# Patient Record
Sex: FEMALE | Race: WHITE | Employment: OTHER | URBAN - METROPOLITAN AREA
[De-identification: names, ages, dates, MRNs, and addresses within clinical notes are randomized per-mention and may not be internally consistent; named-entity substitution may affect disease eponyms.]

---

## 2021-03-04 ENCOUNTER — HOSPITAL ENCOUNTER (OUTPATIENT)
Dept: MRI IMAGING | Age: 61
Discharge: HOME | End: 2021-03-04
Attending: PSYCHIATRY & NEUROLOGY
Payer: COMMERCIAL

## 2021-03-04 DIAGNOSIS — I67.81 LEUKOARIOSIS: ICD-10-CM

## 2021-03-04 PROCEDURE — 72141 MRI NECK SPINE W/O DYE: CPT

## 2023-11-22 ENCOUNTER — OFFICE VISIT (OUTPATIENT)
Dept: INTERNAL MEDICINE CLINIC | Facility: CLINIC | Age: 63
End: 2023-11-22
Payer: MEDICARE

## 2023-11-22 VITALS
SYSTOLIC BLOOD PRESSURE: 138 MMHG | DIASTOLIC BLOOD PRESSURE: 82 MMHG | OXYGEN SATURATION: 96 % | HEART RATE: 63 BPM | WEIGHT: 189.2 LBS

## 2023-11-22 DIAGNOSIS — E03.9 HYPOTHYROIDISM, UNSPECIFIED TYPE: ICD-10-CM

## 2023-11-22 DIAGNOSIS — E78.5 HYPERLIPIDEMIA, UNSPECIFIED HYPERLIPIDEMIA TYPE: ICD-10-CM

## 2023-11-22 DIAGNOSIS — I10 HYPERTENSION, UNSPECIFIED TYPE: ICD-10-CM

## 2023-11-22 DIAGNOSIS — R73.9 HIGH BLOOD SUGAR: ICD-10-CM

## 2023-11-22 DIAGNOSIS — M79.7 FIBROMYALGIA SYNDROME: ICD-10-CM

## 2023-11-22 DIAGNOSIS — I35.1 AORTIC VALVE INSUFFICIENCY, ETIOLOGY OF CARDIAC VALVE DISEASE UNSPECIFIED: Primary | ICD-10-CM

## 2023-11-22 DIAGNOSIS — R82.90 ABNORMAL URINE: ICD-10-CM

## 2023-11-22 DIAGNOSIS — F33.41 RECURRENT MAJOR DEPRESSIVE DISORDER, IN PARTIAL REMISSION (HCC): ICD-10-CM

## 2023-11-22 DIAGNOSIS — G47.33 OBSTRUCTIVE SLEEP APNEA: ICD-10-CM

## 2023-11-22 LAB
ALBUMIN SERPL-MCNC: 4.2 G/DL (ref 3.2–4.6)
ALBUMIN/GLOB SERPL: 1.4 (ref 0.4–1.6)
ALP SERPL-CCNC: 90 U/L (ref 50–136)
ALT SERPL-CCNC: 23 U/L (ref 12–65)
ANION GAP SERPL CALC-SCNC: 7 MMOL/L (ref 2–11)
APPEARANCE UR: CLEAR
AST SERPL-CCNC: 14 U/L (ref 15–37)
BACTERIA URNS QL MICRO: ABNORMAL /HPF
BASOPHILS # BLD: 0.1 K/UL (ref 0–0.2)
BASOPHILS NFR BLD: 1 % (ref 0–2)
BILIRUB SERPL-MCNC: 0.6 MG/DL (ref 0.2–1.1)
BILIRUB UR QL: NEGATIVE
BUN SERPL-MCNC: 12 MG/DL (ref 8–23)
CALCIUM SERPL-MCNC: 9.2 MG/DL (ref 8.3–10.4)
CASTS URNS QL MICRO: 0 /LPF
CHLORIDE SERPL-SCNC: 108 MMOL/L (ref 101–110)
CHOLEST SERPL-MCNC: 189 MG/DL
CO2 SERPL-SCNC: 26 MMOL/L (ref 21–32)
COLOR UR: ABNORMAL
CREAT SERPL-MCNC: 0.9 MG/DL (ref 0.6–1)
CRYSTALS URNS QL MICRO: 0 /LPF
DIFFERENTIAL METHOD BLD: NORMAL
EOSINOPHIL # BLD: 0.3 K/UL (ref 0–0.8)
EOSINOPHIL NFR BLD: 4 % (ref 0.5–7.8)
EPI CELLS #/AREA URNS HPF: ABNORMAL /HPF
ERYTHROCYTE [DISTWIDTH] IN BLOOD BY AUTOMATED COUNT: 13.9 % (ref 11.9–14.6)
GLOBULIN SER CALC-MCNC: 2.9 G/DL (ref 2.8–4.5)
GLUCOSE SERPL-MCNC: 92 MG/DL (ref 65–100)
GLUCOSE UR STRIP.AUTO-MCNC: NEGATIVE MG/DL
HCT VFR BLD AUTO: 43.3 % (ref 35.8–46.3)
HDLC SERPL-MCNC: 41 MG/DL (ref 40–60)
HDLC SERPL: 4.6
HGB BLD-MCNC: 14.2 G/DL (ref 11.7–15.4)
HGB UR QL STRIP: NEGATIVE
IMM GRANULOCYTES # BLD AUTO: 0 K/UL (ref 0–0.5)
IMM GRANULOCYTES NFR BLD AUTO: 0 % (ref 0–5)
KETONES UR QL STRIP.AUTO: NEGATIVE MG/DL
LDLC SERPL CALC-MCNC: 123.4 MG/DL
LEUKOCYTE ESTERASE UR QL STRIP.AUTO: ABNORMAL
LYMPHOCYTES # BLD: 1.9 K/UL (ref 0.5–4.6)
LYMPHOCYTES NFR BLD: 30 % (ref 13–44)
MCH RBC QN AUTO: 28.2 PG (ref 26.1–32.9)
MCHC RBC AUTO-ENTMCNC: 32.8 G/DL (ref 31.4–35)
MCV RBC AUTO: 85.9 FL (ref 82–102)
MONOCYTES # BLD: 0.3 K/UL (ref 0.1–1.3)
MONOCYTES NFR BLD: 5 % (ref 4–12)
MUCOUS THREADS URNS QL MICRO: 0 /LPF
NEUTS SEG # BLD: 3.8 K/UL (ref 1.7–8.2)
NEUTS SEG NFR BLD: 60 % (ref 43–78)
NITRITE UR QL STRIP.AUTO: NEGATIVE
NRBC # BLD: 0 K/UL (ref 0–0.2)
OTHER OBSERVATIONS: ABNORMAL
PH UR STRIP: 7 (ref 5–9)
PLATELET # BLD AUTO: 281 K/UL (ref 150–450)
PMV BLD AUTO: 11.5 FL (ref 9.4–12.3)
POTASSIUM SERPL-SCNC: 4.2 MMOL/L (ref 3.5–5.1)
PROT SERPL-MCNC: 7.1 G/DL (ref 6.3–8.2)
PROT UR STRIP-MCNC: NEGATIVE MG/DL
RBC # BLD AUTO: 5.04 M/UL (ref 4.05–5.2)
RBC #/AREA URNS HPF: ABNORMAL /HPF
SODIUM SERPL-SCNC: 141 MMOL/L (ref 133–143)
SP GR UR REFRACTOMETRY: 1.02 (ref 1–1.02)
TRIGL SERPL-MCNC: 123 MG/DL (ref 35–150)
TSH, 3RD GENERATION: 3.15 UIU/ML (ref 0.36–3.74)
URINE CULTURE IF INDICATED: ABNORMAL
UROBILINOGEN UR QL STRIP.AUTO: 1 EU/DL (ref 0.2–1)
VLDLC SERPL CALC-MCNC: 24.6 MG/DL (ref 6–23)
WBC # BLD AUTO: 6.4 K/UL (ref 4.3–11.1)
WBC URNS QL MICRO: ABNORMAL /HPF

## 2023-11-22 PROCEDURE — 3079F DIAST BP 80-89 MM HG: CPT | Performed by: INTERNAL MEDICINE

## 2023-11-22 PROCEDURE — G8421 BMI NOT CALCULATED: HCPCS | Performed by: INTERNAL MEDICINE

## 2023-11-22 PROCEDURE — 99204 OFFICE O/P NEW MOD 45 MIN: CPT | Performed by: INTERNAL MEDICINE

## 2023-11-22 PROCEDURE — 3017F COLORECTAL CA SCREEN DOC REV: CPT | Performed by: INTERNAL MEDICINE

## 2023-11-22 PROCEDURE — 1036F TOBACCO NON-USER: CPT | Performed by: INTERNAL MEDICINE

## 2023-11-22 PROCEDURE — G8427 DOCREV CUR MEDS BY ELIG CLIN: HCPCS | Performed by: INTERNAL MEDICINE

## 2023-11-22 PROCEDURE — G8484 FLU IMMUNIZE NO ADMIN: HCPCS | Performed by: INTERNAL MEDICINE

## 2023-11-22 PROCEDURE — 3075F SYST BP GE 130 - 139MM HG: CPT | Performed by: INTERNAL MEDICINE

## 2023-11-22 RX ORDER — ASPIRIN 81 MG/1
81 TABLET, CHEWABLE ORAL DAILY
COMMUNITY

## 2023-11-22 RX ORDER — NAPROXEN 500 MG/1
500 TABLET ORAL 2 TIMES DAILY WITH MEALS
COMMUNITY

## 2023-11-22 NOTE — PROGRESS NOTES
FOLLOW UP VISIT    Subjective:    Annamaria López (: 1960) is a 59 y.o., female,   Chief Complaint   Patient presents with    [facility] Care       HPI:  Very nice 59year old female in to [facility] care  1. NSTEMI has seen Cardiology Dr. Mao Spotted   2 CAD on asa   3. High blood pressure on meds   4. High cholesterol- on meds  5. Hypothyroid on meds  6. Fibromyalgia on cymblata  7. Depression on cymbalta  8.  HCM Colonoscopy : 2016 - due  Mammogram 2023 Pap S/P TAHBSO CPE  6 months  Vaccines  due          The following portions of the patient's history were reviewed and updated as appropriate:      Past Medical History:   Diagnosis Date    Acne rosacea     Anxiety and depression     Aortic insufficiency 2016    mild to moderate AI     Atopic dermatitis     Atrophic gastritis without hemorrhage     Bronchitis     CAD (coronary artery disease)     Chest pain, mid sternal 2015    Chronic depression 2016    Chronic intractable pain 2016    Chronic left hip pain     Claustrophobia     DDD (degenerative disc disease)     Depression headache     Dizziness     Dysuria     Easy bruising     Fatigue     Female stress incontinence 2015    Fibromyalgia muscle pain     Fibromyalgia syndrome     Headache     Heat stroke and sunstroke     Hormone replacement therapy     Hot flashes     Hypercholesteremia     Hyperlipemia 2015    Hypersomnia due to medical condition 2015    Hypertension     Hypothyroidism     Insomnia     Joint inflammation     Joint pain     Keratosis, actinic     Major depressive disorder, recurrent episode, moderate with melancholic features ([address]) 2/10/2792    Menopausal disorder     Migraine     Nocturnal hypoxemia 2015    NSTEMI (non-ST elevated myocardial infarction) ([address]) 2015    Obesity 2016    Obstructive sleep apnea 2015    Osteoarthritis 2016    knee osteoarthritis    Palpitations     PLMD (periodic limb movement disorder) 2015    Sciatic

## 2023-11-23 LAB
EST. AVERAGE GLUCOSE BLD GHB EST-MCNC: 105 MG/DL
HBA1C MFR BLD: 5.3 % (ref 4.8–5.6)

## 2023-11-25 LAB
BACTERIA SPEC CULT: NORMAL
BACTERIA SPEC CULT: NORMAL
SERVICE CMNT-IMP: NORMAL

## 2024-02-22 ENCOUNTER — OFFICE VISIT (OUTPATIENT)
Dept: PRIMARY CARE CLINIC | Facility: CLINIC | Age: 64
End: 2024-02-22
Payer: MEDICARE

## 2024-02-22 VITALS
HEART RATE: 78 BPM | HEIGHT: 61 IN | BODY MASS INDEX: 35.8 KG/M2 | WEIGHT: 189.6 LBS | SYSTOLIC BLOOD PRESSURE: 118 MMHG | DIASTOLIC BLOOD PRESSURE: 68 MMHG | OXYGEN SATURATION: 98 %

## 2024-02-22 DIAGNOSIS — Z00.00 ANNUAL PHYSICAL EXAM: ICD-10-CM

## 2024-02-22 DIAGNOSIS — Z71.89 ACP (ADVANCE CARE PLANNING): ICD-10-CM

## 2024-02-22 DIAGNOSIS — Z00.00 ENCOUNTER FOR WELL ADULT EXAM WITHOUT ABNORMAL FINDINGS: Primary | ICD-10-CM

## 2024-02-22 DIAGNOSIS — R73.9 HIGH BLOOD SUGAR: ICD-10-CM

## 2024-02-22 LAB
ALBUMIN SERPL-MCNC: 3.7 G/DL (ref 3.2–4.6)
ALBUMIN/GLOB SERPL: 1.2 (ref 0.4–1.6)
ALP SERPL-CCNC: 91 U/L (ref 50–136)
ALT SERPL-CCNC: 26 U/L (ref 12–65)
ANION GAP SERPL CALC-SCNC: 3 MMOL/L (ref 2–11)
APPEARANCE UR: CLEAR
AST SERPL-CCNC: 15 U/L (ref 15–37)
BACTERIA URNS QL MICRO: ABNORMAL /HPF
BASOPHILS # BLD: 0.1 K/UL (ref 0–0.2)
BASOPHILS NFR BLD: 1 % (ref 0–2)
BILIRUB SERPL-MCNC: 0.5 MG/DL (ref 0.2–1.1)
BILIRUB UR QL: NEGATIVE
BUN SERPL-MCNC: 10 MG/DL (ref 8–23)
CALCIUM SERPL-MCNC: 9.2 MG/DL (ref 8.3–10.4)
CASTS URNS QL MICRO: 0 /LPF
CHLORIDE SERPL-SCNC: 109 MMOL/L (ref 103–113)
CHOLEST SERPL-MCNC: 180 MG/DL
CO2 SERPL-SCNC: 29 MMOL/L (ref 21–32)
COLOR UR: ABNORMAL
CREAT SERPL-MCNC: 0.8 MG/DL (ref 0.6–1)
CRYSTALS URNS QL MICRO: 0 /LPF
DIFFERENTIAL METHOD BLD: ABNORMAL
EOSINOPHIL # BLD: 0.6 K/UL (ref 0–0.8)
EOSINOPHIL NFR BLD: 9 % (ref 0.5–7.8)
EPI CELLS #/AREA URNS HPF: ABNORMAL /HPF
ERYTHROCYTE [DISTWIDTH] IN BLOOD BY AUTOMATED COUNT: 13.7 % (ref 11.9–14.6)
GLOBULIN SER CALC-MCNC: 3.1 G/DL (ref 2.8–4.5)
GLUCOSE SERPL-MCNC: 88 MG/DL (ref 65–100)
GLUCOSE UR STRIP.AUTO-MCNC: NEGATIVE MG/DL
HCT VFR BLD AUTO: 40.6 % (ref 35.8–46.3)
HDLC SERPL-MCNC: 42 MG/DL (ref 40–60)
HDLC SERPL: 4.3
HGB BLD-MCNC: 13.3 G/DL (ref 11.7–15.4)
HGB UR QL STRIP: NEGATIVE
IMM GRANULOCYTES # BLD AUTO: 0 K/UL (ref 0–0.5)
IMM GRANULOCYTES NFR BLD AUTO: 1 % (ref 0–5)
KETONES UR QL STRIP.AUTO: NEGATIVE MG/DL
LDLC SERPL CALC-MCNC: 113 MG/DL
LEUKOCYTE ESTERASE UR QL STRIP.AUTO: ABNORMAL
LYMPHOCYTES # BLD: 1.7 K/UL (ref 0.5–4.6)
LYMPHOCYTES NFR BLD: 24 % (ref 13–44)
MCH RBC QN AUTO: 28.4 PG (ref 26.1–32.9)
MCHC RBC AUTO-ENTMCNC: 32.8 G/DL (ref 31.4–35)
MCV RBC AUTO: 86.8 FL (ref 82–102)
MONOCYTES # BLD: 0.4 K/UL (ref 0.1–1.3)
MONOCYTES NFR BLD: 5 % (ref 4–12)
MUCOUS THREADS URNS QL MICRO: ABNORMAL /LPF
NEUTS SEG # BLD: 4.3 K/UL (ref 1.7–8.2)
NEUTS SEG NFR BLD: 60 % (ref 43–78)
NITRITE UR QL STRIP.AUTO: NEGATIVE
NRBC # BLD: 0 K/UL (ref 0–0.2)
OTHER OBSERVATIONS: ABNORMAL
PH UR STRIP: 5.5 (ref 5–9)
PLATELET # BLD AUTO: 247 K/UL (ref 150–450)
PMV BLD AUTO: 11.5 FL (ref 9.4–12.3)
POTASSIUM SERPL-SCNC: 4.4 MMOL/L (ref 3.5–5.1)
PROT SERPL-MCNC: 6.8 G/DL (ref 6.3–8.2)
PROT UR STRIP-MCNC: NEGATIVE MG/DL
RBC # BLD AUTO: 4.68 M/UL (ref 4.05–5.2)
RBC #/AREA URNS HPF: ABNORMAL /HPF
SODIUM SERPL-SCNC: 141 MMOL/L (ref 136–146)
SP GR UR REFRACTOMETRY: 1.01 (ref 1–1.02)
TRIGL SERPL-MCNC: 125 MG/DL (ref 35–150)
TSH, 3RD GENERATION: 4.57 UIU/ML (ref 0.36–3.74)
URINE CULTURE IF INDICATED: ABNORMAL
UROBILINOGEN UR QL STRIP.AUTO: 0.2 EU/DL (ref 0.2–1)
VLDLC SERPL CALC-MCNC: 25 MG/DL (ref 6–23)
WBC # BLD AUTO: 7.1 K/UL (ref 4.3–11.1)
WBC URNS QL MICRO: ABNORMAL /HPF

## 2024-02-22 PROCEDURE — 3074F SYST BP LT 130 MM HG: CPT | Performed by: INTERNAL MEDICINE

## 2024-02-22 PROCEDURE — 3078F DIAST BP <80 MM HG: CPT | Performed by: INTERNAL MEDICINE

## 2024-02-22 PROCEDURE — 99396 PREV VISIT EST AGE 40-64: CPT | Performed by: INTERNAL MEDICINE

## 2024-02-22 RX ORDER — LISINOPRIL 10 MG/1
10 TABLET ORAL DAILY
Qty: 90 TABLET | Refills: 1 | Status: SHIPPED | OUTPATIENT
Start: 2024-02-22

## 2024-02-22 RX ORDER — LEVOTHYROXINE SODIUM 0.05 MG/1
50 TABLET ORAL
Qty: 90 TABLET | Refills: 1 | Status: SHIPPED | OUTPATIENT
Start: 2024-02-22

## 2024-02-22 RX ORDER — RIZATRIPTAN BENZOATE 10 MG/1
10 TABLET ORAL
Qty: 30 TABLET | Refills: 1 | Status: SHIPPED | OUTPATIENT
Start: 2024-02-22 | End: 2024-02-22

## 2024-02-22 RX ORDER — PRAVASTATIN SODIUM 40 MG
40 TABLET ORAL DAILY
Qty: 90 TABLET | Refills: 1 | Status: SHIPPED | OUTPATIENT
Start: 2024-02-22

## 2024-02-22 RX ORDER — DULOXETIN HYDROCHLORIDE 60 MG/1
60 CAPSULE, DELAYED RELEASE ORAL DAILY
Qty: 90 CAPSULE | Refills: 0 | Status: SHIPPED | OUTPATIENT
Start: 2024-02-22

## 2024-02-22 RX ORDER — METOPROLOL TARTRATE 50 MG/1
50 TABLET, FILM COATED ORAL 2 TIMES DAILY
Qty: 180 TABLET | Refills: 1 | Status: SHIPPED | OUTPATIENT
Start: 2024-02-22

## 2024-02-22 ASSESSMENT — PATIENT HEALTH QUESTIONNAIRE - PHQ9
3. TROUBLE FALLING OR STAYING ASLEEP: 0
5. POOR APPETITE OR OVEREATING: 0
7. TROUBLE CONCENTRATING ON THINGS, SUCH AS READING THE NEWSPAPER OR WATCHING TELEVISION: 3
SUM OF ALL RESPONSES TO PHQ QUESTIONS 1-9: 10
10. IF YOU CHECKED OFF ANY PROBLEMS, HOW DIFFICULT HAVE THESE PROBLEMS MADE IT FOR YOU TO DO YOUR WORK, TAKE CARE OF THINGS AT HOME, OR GET ALONG WITH OTHER PEOPLE: 0
6. FEELING BAD ABOUT YOURSELF - OR THAT YOU ARE A FAILURE OR HAVE LET YOURSELF OR YOUR FAMILY DOWN: 1
SUM OF ALL RESPONSES TO PHQ QUESTIONS 1-9: 10
4. FEELING TIRED OR HAVING LITTLE ENERGY: 3
9. THOUGHTS THAT YOU WOULD BE BETTER OFF DEAD, OR OF HURTING YOURSELF: 0
SUM OF ALL RESPONSES TO PHQ QUESTIONS 1-9: 10
SUM OF ALL RESPONSES TO PHQ QUESTIONS 1-9: 10
2. FEELING DOWN, DEPRESSED OR HOPELESS: 3
8. MOVING OR SPEAKING SO SLOWLY THAT OTHER PEOPLE COULD HAVE NOTICED. OR THE OPPOSITE, BEING SO FIGETY OR RESTLESS THAT YOU HAVE BEEN MOVING AROUND A LOT MORE THAN USUAL: 0

## 2024-02-22 ASSESSMENT — ENCOUNTER SYMPTOMS
CONSTIPATION: 0
BLOOD IN STOOL: 0
EYE PAIN: 0
EYES NEGATIVE: 1
EYE ITCHING: 0
CHEST TIGHTNESS: 0
RECTAL PAIN: 0
ABDOMINAL DISTENTION: 0
BACK PAIN: 0
WHEEZING: 0
DIARRHEA: 0
ALLERGIC/IMMUNOLOGIC NEGATIVE: 1
NAUSEA: 0
STRIDOR: 0
GASTROINTESTINAL NEGATIVE: 1
ANAL BLEEDING: 0
CHOKING: 0
ABDOMINAL PAIN: 0
SHORTNESS OF BREATH: 0
COUGH: 0
EYE DISCHARGE: 0
EYE REDNESS: 0
RESPIRATORY NEGATIVE: 1
SINUS PRESSURE: 0
RHINORRHEA: 0
COLOR CHANGE: 0

## 2024-02-23 LAB
EST. AVERAGE GLUCOSE BLD GHB EST-MCNC: 103 MG/DL
HBA1C MFR BLD: 5.2 % (ref 4.8–5.6)

## 2024-02-24 ENCOUNTER — PATIENT MESSAGE (OUTPATIENT)
Dept: PRIMARY CARE CLINIC | Facility: CLINIC | Age: 64
End: 2024-02-24

## 2024-02-26 ENCOUNTER — TELEPHONE (OUTPATIENT)
Dept: PRIMARY CARE CLINIC | Facility: CLINIC | Age: 64
End: 2024-02-26

## 2024-02-26 NOTE — TELEPHONE ENCOUNTER
From: Tyrone Pink  To: Dr. Brooke Shafer  Sent: 2/24/2024 5:51 AM EST  Subject: Uti    Back is killing me need util medicine please Tyrone Pink

## 2024-02-26 NOTE — TELEPHONE ENCOUNTER
Pt reports the following:  Left side lower back pain with onset Saturday 2/24/24 - no known trauma/injury  Pt also reports new onset of vaginal itching.

## 2024-02-26 NOTE — TELEPHONE ENCOUNTER
LVM to call office if she would like to see if PCP can see her sooner than 02/28/24. Pt also informed PCP does not prescribe controlled substances and/or narcotics.

## 2024-02-27 ENCOUNTER — TELEMEDICINE (OUTPATIENT)
Dept: PRIMARY CARE CLINIC | Facility: CLINIC | Age: 64
End: 2024-02-27
Payer: MEDICARE

## 2024-02-27 DIAGNOSIS — E78.5 HYPERLIPIDEMIA, UNSPECIFIED HYPERLIPIDEMIA TYPE: ICD-10-CM

## 2024-02-27 DIAGNOSIS — R82.90 ABNORMAL URINE: ICD-10-CM

## 2024-02-27 DIAGNOSIS — E03.9 HYPOTHYROIDISM, UNSPECIFIED TYPE: ICD-10-CM

## 2024-02-27 DIAGNOSIS — R73.9 HIGH BLOOD SUGAR: ICD-10-CM

## 2024-02-27 DIAGNOSIS — I10 HYPERTENSION, UNSPECIFIED TYPE: Primary | ICD-10-CM

## 2024-02-27 PROCEDURE — 99214 OFFICE O/P EST MOD 30 MIN: CPT | Performed by: INTERNAL MEDICINE

## 2024-02-27 RX ORDER — NITROFURANTOIN 25; 75 MG/1; MG/1
100 CAPSULE ORAL 2 TIMES DAILY
Qty: 10 CAPSULE | Refills: 0 | Status: SHIPPED | OUTPATIENT
Start: 2024-02-27 | End: 2024-03-03

## 2024-02-27 RX ORDER — LEVOTHYROXINE SODIUM 0.07 MG/1
75 TABLET ORAL DAILY
Qty: 90 TABLET | Refills: 1 | Status: SHIPPED | OUTPATIENT
Start: 2024-02-27

## 2024-02-27 ASSESSMENT — ENCOUNTER SYMPTOMS
GASTROINTESTINAL NEGATIVE: 1
RESPIRATORY NEGATIVE: 1

## 2024-02-27 NOTE — PROGRESS NOTES
FOLLOW UP VISIT    Subjective:    Tyrone Pink (: 1960) is a 63 y.o., female,   Chief Complaint   Patient presents with   • Discuss Labs       HPI:  Very nice 63 year old female in to follow up.  TSH high and urine was abnl  1. NSTEMI has seen Cardiology Dr. Nichoals   2 CAD on asa   3. High blood pressure on meds   4. High cholesterol- on meds  5. Hypothyroid on meds TSH slight high does have fatigue  6. Fibromyalgia on cymblata  7. Depression on cymbalta  8. HCM Colonoscopy : 2016 - due  Mammogram 2023 Pap S/P TAHBSO CPE  6 months  Vaccines  due        The following portions of the patient's history were reviewed and updated as appropriate:      Past Medical History:   Diagnosis Date   • Acne rosacea    • Anxiety and depression    • Aortic insufficiency 2016    mild to moderate AI    • Atopic dermatitis    • Atrophic gastritis without hemorrhage    • Bronchitis    • CAD (coronary artery disease)    • Chest pain, mid sternal 2015   • Chronic depression 2016   • Chronic intractable pain 2016   • Chronic left hip pain    • Claustrophobia    • DDD (degenerative disc disease)    • Depression headache    • Dizziness    • Dysuria    • Easy bruising    • Fatigue    • Female stress incontinence 2015   • Fibromyalgia muscle pain    • Fibromyalgia syndrome    • Headache    • Heat stroke and sunstroke    • Hormone replacement therapy    • Hot flashes    • Hypercholesteremia    • Hyperlipemia 2015   • Hypersomnia due to medical condition 2015   • Hypertension    • Hypothyroidism    • Insomnia    • Joint inflammation    • Joint pain    • Keratosis, actinic    • Major depressive disorder, recurrent episode, moderate with melancholic features ([facility]) 2016   • Menopausal disorder    • Migraine    • Nocturnal hypoxemia 2015   • NSTEMI (non-ST elevated myocardial infarction) ([facility]) 2015   • Obesity 2016   • Obstructive sleep apnea 2015   • Osteoarthritis 2016

## 2024-04-02 ENCOUNTER — OFFICE VISIT (OUTPATIENT)
Dept: PRIMARY CARE CLINIC | Facility: CLINIC | Age: 64
End: 2024-04-02
Payer: MEDICARE

## 2024-04-02 DIAGNOSIS — M25.519 SHOULDER PAIN, UNSPECIFIED CHRONICITY, UNSPECIFIED LATERALITY: ICD-10-CM

## 2024-04-02 DIAGNOSIS — M54.2 NECK PAIN: Primary | ICD-10-CM

## 2024-04-02 PROCEDURE — 99214 OFFICE O/P EST MOD 30 MIN: CPT | Performed by: INTERNAL MEDICINE

## 2024-04-02 RX ORDER — LORAZEPAM 0.5 MG/1
0.5 TABLET ORAL DAILY PRN
Qty: 2 TABLET | Refills: 0 | Status: SHIPPED | OUTPATIENT
Start: 2024-04-02 | End: 2024-04-04

## 2024-04-02 RX ORDER — METHYLPREDNISOLONE 4 MG/1
TABLET ORAL
Qty: 1 KIT | Refills: 0 | Status: SHIPPED | OUTPATIENT
Start: 2024-04-02 | End: 2024-04-08

## 2024-04-02 RX ORDER — CYCLOBENZAPRINE HCL 10 MG
10 TABLET ORAL NIGHTLY PRN
Qty: 10 TABLET | Refills: 0 | Status: SHIPPED | OUTPATIENT
Start: 2024-04-02 | End: 2024-04-12

## 2024-04-02 SDOH — ECONOMIC STABILITY: FOOD INSECURITY: WITHIN THE PAST 12 MONTHS, YOU WORRIED THAT YOUR FOOD WOULD RUN OUT BEFORE YOU GOT MONEY TO BUY MORE.: NEVER TRUE

## 2024-04-02 SDOH — ECONOMIC STABILITY: INCOME INSECURITY: HOW HARD IS IT FOR YOU TO PAY FOR THE VERY BASICS LIKE FOOD, HOUSING, MEDICAL CARE, AND HEATING?: NOT HARD AT ALL

## 2024-04-02 SDOH — ECONOMIC STABILITY: FOOD INSECURITY: WITHIN THE PAST 12 MONTHS, THE FOOD YOU BOUGHT JUST DIDN'T LAST AND YOU DIDN'T HAVE MONEY TO GET MORE.: NEVER TRUE

## 2024-04-02 SDOH — ECONOMIC STABILITY: HOUSING INSECURITY
IN THE LAST 12 MONTHS, WAS THERE A TIME WHEN YOU DID NOT HAVE A STEADY PLACE TO SLEEP OR SLEPT IN A SHELTER (INCLUDING NOW)?: NO

## 2024-04-02 ASSESSMENT — ENCOUNTER SYMPTOMS: RESPIRATORY NEGATIVE: 1

## 2024-04-02 NOTE — PROGRESS NOTES
Hunger Vital Sign    • Worried About Running Out of Food in the Last Year: Never true    • Ran Out of Food in the Last Year: Never true   Transportation Needs: Unknown (4/2/2024)    PRAPARE - Transportation    • Lack of Transportation (Medical): Not on file    • Lack of Transportation (Non-Medical): No   Physical Activity: Not on file   Stress: Not on file   Social Connections: Not on file   Intimate Partner Violence: Not on file   Housing Stability: Unknown (4/2/2024)    Housing Stability Vital Sign    • Unable to Pay for Housing in the Last Year: Not on file    • Number of Places Lived in the Last Year: Not on file    • Unstable Housing in the Last Year: No       Current Outpatient Medications   Medication Sig Dispense Refill   • levothyroxine (SYNTHROID) 75 MCG tablet Take 1 tablet by mouth daily 90 tablet 1   • lisinopril (PRINIVIL;ZESTRIL) 10 MG tablet Take 1 tablet by mouth daily 90 tablet 1   • pravastatin (PRAVACHOL) 40 MG tablet Take 1 tablet by mouth daily 90 tablet 1   • metoprolol tartrate (LOPRESSOR) 50 MG tablet Take 1 tablet by mouth 2 times daily 180 tablet 1   • DULoxetine (CYMBALTA) 60 MG extended release capsule Take 1 capsule by mouth daily 90 capsule 0   • aspirin 81 MG chewable tablet Take 1 tablet by mouth daily     • naproxen (NAPROSYN) 500 MG tablet Take 1 tablet by mouth 2 times daily as needed     • rizatriptan (MAXALT) 10 MG tablet Take 1 tablet by mouth once as needed for Migraine 30 tablet 1     No current facility-administered medications for this visit.       Allergies as of 04/02/2024   • (No Known Allergies)       Review of Systems   Constitutional: Negative.    Respiratory: Negative.     Cardiovascular: Negative.    Genitourinary: Negative.      Objective:    There were no vitals taken for this visit.    Physical Exam  Vitals and nursing note reviewed.   Constitutional:       Appearance: Normal appearance.   Cardiovascular:      Rate and Rhythm: Normal rate and regular rhythm.

## 2024-04-12 ENCOUNTER — TELEPHONE (OUTPATIENT)
Dept: PRIMARY CARE CLINIC | Facility: CLINIC | Age: 64
End: 2024-04-12

## 2024-04-12 NOTE — TELEPHONE ENCOUNTER
Jennifer MONTOYA pre access  reports [facility] requesting- peer 2 peer r/t MRI 04/10/24 being declined  Phone #: [phone]  Case ID: [id number]

## 2024-04-22 ENCOUNTER — OFFICE VISIT (OUTPATIENT)
Dept: ORTHOPEDIC SURGERY | Age: 64
End: 2024-04-22
Payer: MEDICARE

## 2024-04-22 DIAGNOSIS — G56.03 BILATERAL CARPAL TUNNEL SYNDROME: ICD-10-CM

## 2024-04-22 DIAGNOSIS — M50.30 DEGENERATIVE DISC DISEASE, CERVICAL: Primary | ICD-10-CM

## 2024-04-22 DIAGNOSIS — M25.511 ACUTE PAIN OF BOTH SHOULDERS: ICD-10-CM

## 2024-04-22 DIAGNOSIS — M25.512 ACUTE PAIN OF BOTH SHOULDERS: ICD-10-CM

## 2024-04-22 PROCEDURE — 99204 OFFICE O/P NEW MOD 45 MIN: CPT | Performed by: NURSE PRACTITIONER

## 2024-04-22 RX ORDER — MELOXICAM 15 MG/1
15 TABLET ORAL DAILY PRN
Qty: 30 TABLET | Refills: 0 | Status: SHIPPED | OUTPATIENT
Start: 2024-04-22

## 2024-04-22 RX ORDER — CYCLOBENZAPRINE HCL 5 MG
5 TABLET ORAL 3 TIMES DAILY PRN
Qty: 15 TABLET | Refills: 0 | Status: SHIPPED | OUTPATIENT
Start: 2024-04-22 | End: 2024-05-02

## 2024-04-22 NOTE — PROGRESS NOTES
(non-ST elevated myocardial infarction) (HCC) 5/28/2015    Obesity 7/11/2016    Obstructive sleep apnea 5/8/2015    Osteoarthritis 7/11/2016    knee osteoarthritis    Palpitations     PLMD (periodic limb movement disorder) 5/8/2015    Sciatic nerve pain 7/11/2016    Sinusitis     Sleep apnea     cpap at night    Tachycardia 6/1/2016    nsr NO ARRHYTHMIAS    Ulcer of finger (HCC)        Tobacco:  reports that she has never smoked. She has never used smokeless tobacco.  Alcohol:   Social History     Substance and Sexual Activity   Alcohol Use No        Physical Exam:     GENERAL:  Adult in no acute distress, well developed, well nourished . Patient is appropriately conversant  CERVICAL SPINE:  Inspection of the neck reveals no evidence of rash or skin lesion.  Examination of the cervical spine reveals no evidence of sagittal or coronal plane deformity, decreased ROM, and palpable tenderness  Spurling's sign painful but negative side for reproduction of radicular symptoms.    Gait does not suggest myelopathy.    Sensory testing reveals intact sensation to light touch in the distribution of the C5-T1 dermatomes bilaterally.    Reflexes     Right Left   Biceps (C5) 2 2   Brachio radialis (C6) 2 2    Triceps (C7) 2 2     Rae's is negative    Ankle jerk is negative   Finger escape test is negative  Inverted radial reflex is negative    Tinel's and Angelika testing over the cubital and carpal tunnels does reproduce the symptoms.    Shoulder examination is  consistent with adhesive capsulitis or acute rotator cuff tendinitis.  Patient has significant range of motion limitation of both shoulders right is greater than left.    The patient does not have difficulty with rapid alternating hand movements.    Strength testing in the upper extremity reveals the following based on the 5 point grading scale:     Delt(C5) Bicep(C6) WE(C6) Tricep (C7) WF(C7) (C8) Int (T1)   Right 5 5 5 5 5 5 5   Left 5 5 5 5 5 5 5     Pulses are

## 2024-05-22 ENCOUNTER — OFFICE VISIT (OUTPATIENT)
Dept: ORTHOPEDIC SURGERY | Age: 64
End: 2024-05-22
Payer: MEDICARE

## 2024-05-22 DIAGNOSIS — M25.512 BILATERAL SHOULDER PAIN, UNSPECIFIED CHRONICITY: Primary | ICD-10-CM

## 2024-05-22 DIAGNOSIS — M25.511 BILATERAL SHOULDER PAIN, UNSPECIFIED CHRONICITY: Primary | ICD-10-CM

## 2024-05-22 DIAGNOSIS — M67.922 TENDINOPATHY OF LEFT BICEPS: ICD-10-CM

## 2024-05-22 PROCEDURE — 20610 DRAIN/INJ JOINT/BURSA W/O US: CPT | Performed by: ORTHOPAEDIC SURGERY

## 2024-05-22 PROCEDURE — 99204 OFFICE O/P NEW MOD 45 MIN: CPT | Performed by: ORTHOPAEDIC SURGERY

## 2024-05-22 RX ORDER — METHYLPREDNISOLONE ACETATE 40 MG/ML
160 INJECTION, SUSPENSION INTRA-ARTICULAR; INTRALESIONAL; INTRAMUSCULAR; SOFT TISSUE ONCE
Status: COMPLETED | OUTPATIENT
Start: 2024-05-22 | End: 2024-05-22

## 2024-05-22 RX ADMIN — METHYLPREDNISOLONE ACETATE 160 MG: 40 INJECTION, SUSPENSION INTRA-ARTICULAR; INTRALESIONAL; INTRAMUSCULAR; SOFT TISSUE at 11:06

## 2024-05-22 NOTE — PROGRESS NOTES
her exam does not significant enough for me to warrant an MRI.  Would suggest trial of injection and therapy.  Left side biceps seems more involved.  Right side seems to be more diffuse.  Discussed appropriate dosing for ibuprofen which I think she has been taking a little too much of.    Treatment:     Recommend therapy to evaluate and treat current complaints and pathology. A home exercise program was also discussed with the patient.  Procedure note: After discussion of risks and benefits including, but not limited to pain, infection, steroid flare, increased blood sugar, fat necrosis, skin discoloration, and injury to blood vessels or nerves, the patient verbally consented to proceed with a glenohumeral joint injection.  The affected left shoulder was sterilely prepped in standard fashion and injected with 2 cc of depo medrol   (40mg/ml), 2 cc of 1% Lidocaine, and 2 cc of 0.5% Marcaine into the anterior shoulder near the BICEPS tendon.  The patient tolerated the injection well.   Procedure note: After discussion of risks and benefits including, but not limited to pain, infection, steroid flare, increased blood sugar, fat necrosis, skin discoloration, and injury to blood vessels or nerves, the patient verbally consented to proceed with a glenohumeral joint injection.  The affected right shoulder was sterilely prepped in standard fashion and injected with 2 cc of depo medrol  (40mg/ml), 2 cc of 1% Lidocaine, and 2 cc of 0.5% Marcaine into the JOINT SPACE.  The patient tolerated the injection well.   The appropriate injections were identified with the patient and her patient identifiers.  Patient is of increased medical complexity and increased risk for surgical intervention secondary to History of NSTEMI, CAD, aortic insufficiency, hypothyroidism, sleep apnea, history of cervical spine pathology/surgery    Return in about 6 weeks (around 7/3/2024).    Thank you for the opportunity to see your patient.  If you have

## 2024-06-10 RX ORDER — MELOXICAM 15 MG/1
15 TABLET ORAL DAILY PRN
Qty: 30 TABLET | Refills: 0 | OUTPATIENT
Start: 2024-06-10

## 2024-06-17 RX ORDER — MELOXICAM 15 MG/1
15 TABLET ORAL DAILY PRN
Qty: 30 TABLET | Refills: 0 | OUTPATIENT
Start: 2024-06-17

## 2024-06-24 ENCOUNTER — OFFICE VISIT (OUTPATIENT)
Dept: PRIMARY CARE CLINIC | Facility: CLINIC | Age: 64
End: 2024-06-24
Payer: MEDICARE

## 2024-06-24 VITALS
OXYGEN SATURATION: 99 % | DIASTOLIC BLOOD PRESSURE: 82 MMHG | HEIGHT: 61 IN | SYSTOLIC BLOOD PRESSURE: 128 MMHG | WEIGHT: 177 LBS | HEART RATE: 62 BPM | BODY MASS INDEX: 33.42 KG/M2

## 2024-06-24 DIAGNOSIS — F32.A DEPRESSION, UNSPECIFIED DEPRESSION TYPE: ICD-10-CM

## 2024-06-24 DIAGNOSIS — E78.5 HYPERLIPIDEMIA, UNSPECIFIED HYPERLIPIDEMIA TYPE: ICD-10-CM

## 2024-06-24 DIAGNOSIS — E03.9 HYPOTHYROIDISM, UNSPECIFIED TYPE: ICD-10-CM

## 2024-06-24 DIAGNOSIS — I10 HYPERTENSION, UNSPECIFIED TYPE: ICD-10-CM

## 2024-06-24 DIAGNOSIS — E03.9 HYPOTHYROIDISM, UNSPECIFIED TYPE: Primary | ICD-10-CM

## 2024-06-24 DIAGNOSIS — R73.9 HIGH BLOOD SUGAR: ICD-10-CM

## 2024-06-24 DIAGNOSIS — G43.819 OTHER MIGRAINE WITHOUT STATUS MIGRAINOSUS, INTRACTABLE: ICD-10-CM

## 2024-06-24 LAB
ALBUMIN SERPL-MCNC: 3.6 G/DL (ref 3.2–4.6)
ALBUMIN/GLOB SERPL: 1.2 (ref 1–1.9)
ALP SERPL-CCNC: 83 U/L (ref 35–104)
ALT SERPL-CCNC: 11 U/L (ref 12–65)
ANION GAP SERPL CALC-SCNC: 10 MMOL/L (ref 9–18)
AST SERPL-CCNC: 18 U/L (ref 15–37)
BASOPHILS # BLD: 0.1 K/UL (ref 0–0.2)
BASOPHILS NFR BLD: 1 % (ref 0–2)
BILIRUB SERPL-MCNC: 0.4 MG/DL (ref 0–1.2)
BUN SERPL-MCNC: 16 MG/DL (ref 8–23)
CALCIUM SERPL-MCNC: 9.4 MG/DL (ref 8.8–10.2)
CHLORIDE SERPL-SCNC: 106 MMOL/L (ref 98–107)
CHOLEST SERPL-MCNC: 178 MG/DL (ref 0–200)
CO2 SERPL-SCNC: 25 MMOL/L (ref 20–28)
CREAT SERPL-MCNC: 0.77 MG/DL (ref 0.6–1.1)
DIFFERENTIAL METHOD BLD: ABNORMAL
EOSINOPHIL # BLD: 0.3 K/UL (ref 0–0.8)
EOSINOPHIL NFR BLD: 4 % (ref 0.5–7.8)
ERYTHROCYTE [DISTWIDTH] IN BLOOD BY AUTOMATED COUNT: 15.6 % (ref 11.9–14.6)
EST. AVERAGE GLUCOSE BLD GHB EST-MCNC: 114 MG/DL
GLOBULIN SER CALC-MCNC: 2.9 G/DL (ref 2.3–3.5)
GLUCOSE SERPL-MCNC: 91 MG/DL (ref 70–99)
HBA1C MFR BLD: 5.6 % (ref 0–5.6)
HCT VFR BLD AUTO: 40.1 % (ref 35.8–46.3)
HDLC SERPL-MCNC: 46 MG/DL (ref 40–60)
HDLC SERPL: 3.9 (ref 0–5)
HGB BLD-MCNC: 12.8 G/DL (ref 11.7–15.4)
IMM GRANULOCYTES # BLD AUTO: 0 K/UL (ref 0–0.5)
IMM GRANULOCYTES NFR BLD AUTO: 1 % (ref 0–5)
LDLC SERPL CALC-MCNC: 114 MG/DL (ref 0–100)
LYMPHOCYTES # BLD: 1.7 K/UL (ref 0.5–4.6)
LYMPHOCYTES NFR BLD: 24 % (ref 13–44)
MCH RBC QN AUTO: 27.2 PG (ref 26.1–32.9)
MCHC RBC AUTO-ENTMCNC: 31.9 G/DL (ref 31.4–35)
MCV RBC AUTO: 85.1 FL (ref 82–102)
MONOCYTES # BLD: 0.4 K/UL (ref 0.1–1.3)
MONOCYTES NFR BLD: 5 % (ref 4–12)
NEUTS SEG # BLD: 4.7 K/UL (ref 1.7–8.2)
NEUTS SEG NFR BLD: 65 % (ref 43–78)
NRBC # BLD: 0 K/UL (ref 0–0.2)
PLATELET # BLD AUTO: 297 K/UL (ref 150–450)
PMV BLD AUTO: 11.3 FL (ref 9.4–12.3)
POTASSIUM SERPL-SCNC: 4.3 MMOL/L (ref 3.5–5.1)
PROT SERPL-MCNC: 6.5 G/DL (ref 6.3–8.2)
RBC # BLD AUTO: 4.71 M/UL (ref 4.05–5.2)
SODIUM SERPL-SCNC: 141 MMOL/L (ref 136–145)
TRIGL SERPL-MCNC: 92 MG/DL (ref 0–150)
TSH, 3RD GENERATION: 2.99 UIU/ML (ref 0.27–4.2)
VLDLC SERPL CALC-MCNC: 18 MG/DL (ref 6–23)
WBC # BLD AUTO: 7.2 K/UL (ref 4.3–11.1)

## 2024-06-24 PROCEDURE — 99214 OFFICE O/P EST MOD 30 MIN: CPT | Performed by: INTERNAL MEDICINE

## 2024-06-24 PROCEDURE — 3079F DIAST BP 80-89 MM HG: CPT | Performed by: INTERNAL MEDICINE

## 2024-06-24 PROCEDURE — 3074F SYST BP LT 130 MM HG: CPT | Performed by: INTERNAL MEDICINE

## 2024-06-24 RX ORDER — LEVOTHYROXINE SODIUM 0.07 MG/1
75 TABLET ORAL DAILY
Qty: 90 TABLET | Refills: 1 | Status: SHIPPED | OUTPATIENT
Start: 2024-06-24

## 2024-06-24 RX ORDER — PRAVASTATIN SODIUM 40 MG
40 TABLET ORAL DAILY
Qty: 90 TABLET | Refills: 1 | Status: SHIPPED | OUTPATIENT
Start: 2024-06-24

## 2024-06-24 RX ORDER — DULOXETIN HYDROCHLORIDE 60 MG/1
60 CAPSULE, DELAYED RELEASE ORAL DAILY
Qty: 90 CAPSULE | Refills: 0 | Status: SHIPPED | OUTPATIENT
Start: 2024-06-24

## 2024-06-24 RX ORDER — LISINOPRIL 10 MG/1
10 TABLET ORAL DAILY
Qty: 90 TABLET | Refills: 1 | Status: SHIPPED | OUTPATIENT
Start: 2024-06-24

## 2024-06-24 RX ORDER — RIZATRIPTAN BENZOATE 10 MG/1
10 TABLET ORAL
Qty: 30 TABLET | Refills: 0 | Status: SHIPPED | OUTPATIENT
Start: 2024-06-24 | End: 2024-06-24

## 2024-06-24 RX ORDER — METOPROLOL TARTRATE 50 MG/1
50 TABLET, FILM COATED ORAL 2 TIMES DAILY
Qty: 180 TABLET | Refills: 1 | Status: SHIPPED | OUTPATIENT
Start: 2024-06-24

## 2024-06-24 ASSESSMENT — ENCOUNTER SYMPTOMS: RESPIRATORY NEGATIVE: 1

## 2024-06-24 NOTE — PROGRESS NOTES
FOLLOW UP VISIT    Subjective:    Tyrone Pink (: 1960) is a 64 y.o., female,   Chief Complaint   Patient presents with    Follow-up     fasting    Hypertension       HPI:  Very nice 64 year old in for follow up    1. NSTEMI has seen Cardiology Dr. Nicholas   2 CAD on asa   3. High blood pressure on meds   4. High cholesterol- on meds  5. Hypothyroid on meds TSH slight high does have fatigue  6. Fibromyalgia on cymblata  7. Depression on cymbalta  8. HCM Colonoscopy : 2016 - due  Mammogram 2023 Pap S/P TAHBSO CPE  6 months  Vaccines  due         Hypertension    Shoulder Pain       Very nice patient in for bilateral shoulder pain.  She has had shoulder surgery and neck surgery.  She has had X rays.  She is needing an MRI .      The following portions of the patient's history were reviewed and updated as appropriate:      Past Medical History:   Diagnosis Date    Acne rosacea     Anxiety and depression     Aortic insufficiency 2016    mild to moderate AI     Atopic dermatitis     Atrophic gastritis without hemorrhage     Bronchitis     CAD (coronary artery disease)     Chest pain, mid sternal 2015    Chronic depression 2016    Chronic intractable pain 2016    Chronic left hip pain     Claustrophobia     DDD (degenerative disc disease)     Depression headache     Dizziness     Dysuria     Easy bruising     Fatigue     Female stress incontinence 2015    Fibromyalgia muscle pain     Fibromyalgia syndrome     Headache     Heat stroke and sunstroke     Hormone replacement therapy     Hot flashes     Hypercholesteremia     Hyperlipemia 2015    Hypersomnia due to medical condition 2015    Hypertension     Hypothyroidism     Insomnia     Joint inflammation     Joint pain     Keratosis, actinic     Major depressive disorder, recurrent episode, moderate with melancholic features (HCC) 2016    Menopausal disorder     Migraine     Nocturnal hypoxemia 2015    NSTEMI (non-ST

## 2024-07-10 ENCOUNTER — OFFICE VISIT (OUTPATIENT)
Dept: ORTHOPEDIC SURGERY | Age: 64
End: 2024-07-10
Payer: MEDICARE

## 2024-07-10 DIAGNOSIS — M25.511 BILATERAL SHOULDER PAIN, UNSPECIFIED CHRONICITY: Primary | ICD-10-CM

## 2024-07-10 DIAGNOSIS — M67.922 TENDINOPATHY OF LEFT BICEPS: ICD-10-CM

## 2024-07-10 DIAGNOSIS — M25.512 BILATERAL SHOULDER PAIN, UNSPECIFIED CHRONICITY: Primary | ICD-10-CM

## 2024-07-10 PROCEDURE — 99213 OFFICE O/P EST LOW 20 MIN: CPT | Performed by: ORTHOPAEDIC SURGERY

## 2024-07-10 NOTE — PROGRESS NOTES
compound cream also.  And exercises shown through [employer].      Patient is of increased medical complexity and increased risk for surgical intervention secondary to History of NSTEMI, CAD, aortic insufficiency, hypothyroidism, sleep apnea, history of cervical spine pathology/surgery     Return in about 6 weeks (around 8/21/2024).        David Bolden MD  07/10/24

## 2024-07-10 NOTE — PATIENT INSTRUCTIONS
Shoulder Arthritis: Exercises  Introduction  Here are some examples of exercises for you to try. The exercises may be suggested for a condition or for rehabilitation. Start each exercise slowly. Ease off the exercises if you start to have pain.  You will be told when to start these exercises and which ones will work best for you.  How to do the exercises  Shoulder flexion (lying down)    To make a wand for this exercise, use a piece of PVC pipe or a broom handle with the broom removed. Make the wand about a foot wider than your shoulders.  Lie on your back, holding a wand with both hands. Your palms should face down as you hold the wand.  Keeping your elbows straight, slowly raise your arms over your head. Raise them until you feel a stretch in your shoulders, upper back, and chest.  Hold for 15 to 30 seconds.  Repeat 2 to 4 times.  Shoulder rotation (lying down)    To make a wand for this exercise, use a piece of PVC pipe or a broom handle with the broom removed. Make the wand about a foot wider than your shoulders.  Lie on your back. Hold a wand with both hands with your elbows bent and palms up.  Keep your elbows close to your body, and move the wand across your body toward the sore arm.  Hold for 8 to 12 seconds.  Repeat 2 to 4 times.  Shoulder internal rotation with towel    Hold a towel above and behind your head with the arm that is not sore.  With your sore arm, reach behind your back and grasp the towel.  With the arm above your head, pull the towel upward. Do this until you feel a stretch on the front and outside of your sore shoulder.  Hold 15 to 30 seconds.  Repeat 2 to 4 times.  Shoulder blade squeeze    Stand with your arms at your sides, and squeeze your shoulder blades together. Do not raise your shoulders up as you squeeze.  Hold 6 seconds.  Repeat 8 to 12 times.  Resisted rows    For this exercise, you will need elastic exercise material, such as surgical tubing or Thera-Band.  Put the band around

## 2024-07-11 NOTE — PROGRESS NOTES
Medicare Annual Wellness Visit    Tyrone Pink is here for No chief complaint on file.    Assessment & Plan   Medicare wellness  Recommendations for Preventive Services Due: see orders and patient instructions/AVS.  Recommended screening schedule for the next 5-10 years is provided to the patient in written form: see Patient Instructions/AVS.     No follow-ups on file.     Subjective       Patient's complete Health Risk Assessment and screening values have been reviewed and are found in Flowsheets. The following problems were reviewed today and where indicated follow up appointments were made and/or referrals ordered.    Positive Risk Factor Screenings with Interventions:                Abnormal BMI (obese):  There is no height or weight on file to calculate BMI. (!) Abnormal  Interventions:  Weight loss                           Objective    Patient-Reported Vitals  No data recorded             No Known Allergies  Prior to Visit Medications    Medication Sig Taking? Authorizing Provider   DULoxetine (CYMBALTA) 60 MG extended release capsule Take 1 capsule by mouth daily  Brooke Shafer MD   metoprolol tartrate (LOPRESSOR) 50 MG tablet Take 1 tablet by mouth 2 times daily  Brooke Shafer MD   lisinopril (PRINIVIL;ZESTRIL) 10 MG tablet Take 1 tablet by mouth daily  Brooke Shafer MD   levothyroxine (SYNTHROID) 75 MCG tablet Take 1 tablet by mouth daily  Brooke Shafer MD   pravastatin (PRAVACHOL) 40 MG tablet Take 1 tablet by mouth daily  Brooke Shafer MD   rizatriptan (MAXALT) 10 MG tablet Take 1 tablet by mouth once as needed for Migraine  Brooke Shafer MD   meloxicam (MOBIC) 15 MG tablet Take 1 tablet by mouth daily as needed for Pain  Francisco Berg, APRN - CNP   naproxen (NAPROSYN) 500 MG tablet Take 1 tablet by mouth 2 times daily as needed  Patient not taking: Reported on 4/22/2024  Provider, MD Dain Arango (Including outside providers/suppliers regularly involved in providing care):   Patient

## 2024-07-12 ENCOUNTER — TELEMEDICINE (OUTPATIENT)
Dept: PRIMARY CARE CLINIC | Facility: CLINIC | Age: 64
End: 2024-07-12
Payer: MEDICARE

## 2024-07-12 DIAGNOSIS — Z00.00 MEDICARE ANNUAL WELLNESS VISIT, SUBSEQUENT: Primary | ICD-10-CM

## 2024-07-12 PROCEDURE — G0439 PPPS, SUBSEQ VISIT: HCPCS | Performed by: INTERNAL MEDICINE

## 2024-07-12 ASSESSMENT — PATIENT HEALTH QUESTIONNAIRE - PHQ9
1. LITTLE INTEREST OR PLEASURE IN DOING THINGS: MORE THAN HALF THE DAYS
3. TROUBLE FALLING OR STAYING ASLEEP: SEVERAL DAYS
5. POOR APPETITE OR OVEREATING: NOT AT ALL
10. IF YOU CHECKED OFF ANY PROBLEMS, HOW DIFFICULT HAVE THESE PROBLEMS MADE IT FOR YOU TO DO YOUR WORK, TAKE CARE OF THINGS AT HOME, OR GET ALONG WITH OTHER PEOPLE: NOT DIFFICULT AT ALL
2. FEELING DOWN, DEPRESSED OR HOPELESS: NEARLY EVERY DAY
SUM OF ALL RESPONSES TO PHQ QUESTIONS 1-9: 10
7. TROUBLE CONCENTRATING ON THINGS, SUCH AS READING THE NEWSPAPER OR WATCHING TELEVISION: NEARLY EVERY DAY
8. MOVING OR SPEAKING SO SLOWLY THAT OTHER PEOPLE COULD HAVE NOTICED. OR THE OPPOSITE, BEING SO FIGETY OR RESTLESS THAT YOU HAVE BEEN MOVING AROUND A LOT MORE THAN USUAL: NOT AT ALL
SUM OF ALL RESPONSES TO PHQ QUESTIONS 1-9: 10
SUM OF ALL RESPONSES TO PHQ9 QUESTIONS 1 & 2: 5
4. FEELING TIRED OR HAVING LITTLE ENERGY: SEVERAL DAYS
6. FEELING BAD ABOUT YOURSELF - OR THAT YOU ARE A FAILURE OR HAVE LET YOURSELF OR YOUR FAMILY DOWN: NOT AT ALL
9. THOUGHTS THAT YOU WOULD BE BETTER OFF DEAD, OR OF HURTING YOURSELF: NOT AT ALL

## 2024-07-12 ASSESSMENT — LIFESTYLE VARIABLES
HOW OFTEN DO YOU HAVE A DRINK CONTAINING ALCOHOL: NEVER
HOW MANY STANDARD DRINKS CONTAINING ALCOHOL DO YOU HAVE ON A TYPICAL DAY: PATIENT DOES NOT DRINK

## 2024-07-16 NOTE — PROGRESS NOTES
[facility] CARDIOLOGY, [address], [address]  [place] [address]  PHONE: [phone]    SUBJECTIVE: /HPI  Tyrone Pink (1960) is a 64 y.o. female seen for a follow up visit regarding the following:   Specialty Problems          Cardiology Problems    Chest pain, mid sternal        Hyperlipemia        Hypertension        NSTEMI (non-ST elevated myocardial infarction) (HCC)        Aortic insufficiency        CAD (coronary artery disease)        Migraine         Last HPI with Dr. Nicholas (05/31/2019)    1. Coronary artery disease of native artery of native heart with stable angina pectoris (HCC)           2. NSTEMI (non-ST elevated myocardial infarction) (HCC)     3. Essential hypertension     4. SOB (shortness of breath)     5. Dyspnea on exertion     6. Hyperlipidemia, unspecified hyperlipidemia type        2 weeks ago chest heaviness Pt complains of recent onset exertional chest pain and pressure with associated sob and metzger. Getting worse. Present with activity. Relieved with rest. Some associated diaphoresis and nausea. Symptoms consistent with CCC 3-4 angina. Pt experiences symptoms with mild exertion.     Cardiology Testing (2019)  1. Stress EKG: Normal.   2. SPECT Perfusion Imaging: Normal Perfusion.   3. LV Systolic Function is is normal.   4. Risk Assessment: Low Risk Scan.     Echo moderate AI and moderate MR ef normal        Past Medical History, Past Surgical History, Family history, Social History, and Medications were all reviewed with the patient today and updated as necessary.    No Known Allergies  Past Medical History:   Diagnosis Date    Acne rosacea     Anxiety and depression     Aortic insufficiency 6/1/2016    mild to moderate AI     Atopic dermatitis     Atrophic gastritis without hemorrhage     Bronchitis     CAD (coronary artery disease)     Chest pain, mid sternal 5/28/2015    Chronic depression 7/11/2016    Chronic intractable pain 7/11/2016    Chronic left hip pain

## 2024-07-17 ENCOUNTER — OFFICE VISIT (OUTPATIENT)
Age: 64
End: 2024-07-17
Payer: MEDICARE

## 2024-07-17 VITALS
WEIGHT: 178 LBS | DIASTOLIC BLOOD PRESSURE: 62 MMHG | HEART RATE: 63 BPM | HEIGHT: 62 IN | SYSTOLIC BLOOD PRESSURE: 104 MMHG | BODY MASS INDEX: 32.76 KG/M2

## 2024-07-17 DIAGNOSIS — I10 ESSENTIAL HYPERTENSION: ICD-10-CM

## 2024-07-17 DIAGNOSIS — E78.5 DYSLIPIDEMIA: ICD-10-CM

## 2024-07-17 DIAGNOSIS — I25.10 CORONARY ARTERY DISEASE INVOLVING NATIVE CORONARY ARTERY OF NATIVE HEART, UNSPECIFIED WHETHER ANGINA PRESENT: Primary | ICD-10-CM

## 2024-07-17 DIAGNOSIS — R06.02 SHORTNESS OF BREATH: ICD-10-CM

## 2024-07-17 PROCEDURE — 99205 OFFICE O/P NEW HI 60 MIN: CPT | Performed by: INTERNAL MEDICINE

## 2024-07-17 PROCEDURE — 3078F DIAST BP <80 MM HG: CPT | Performed by: INTERNAL MEDICINE

## 2024-07-17 PROCEDURE — 3074F SYST BP LT 130 MM HG: CPT | Performed by: INTERNAL MEDICINE

## 2024-07-17 PROCEDURE — 93000 ELECTROCARDIOGRAM COMPLETE: CPT | Performed by: INTERNAL MEDICINE

## 2024-07-17 NOTE — PROGRESS NOTES
[facility] CARDIOLOGY, [address], [address]  [place] [address]  PHONE: [phone]    SUBJECTIVE: /HPI  Tyrone Pink (1960) is a 64 y.o. female seen for a follow up visit regarding the following:   Specialty Problems          Cardiology Problems    Chest pain, mid sternal        Hyperlipemia        Hypertension        NSTEMI (non-ST elevated myocardial infarction) (HCC)        Aortic insufficiency        CAD (coronary artery disease)        Migraine         Patient returns for routine follow-up after a couple of years since the last visit.  She endorses that overall feels she is doing well but does get shortness of breath when walking uphill.  Past medical history is significant for history of coronary artery disease non-ST elevation myocardial infarction and valvular heart issues with aortic insufficiency and mitral insufficiency    Past Medical History, Past Surgical History, Family history, Social History, and Medications were all reviewed with the patient today and updated as necessary.    No Known Allergies  Past Medical History:   Diagnosis Date    Acne rosacea     Anxiety and depression     Aortic insufficiency 6/1/2016    mild to moderate AI     Atopic dermatitis     Atrophic gastritis without hemorrhage     Bronchitis     CAD (coronary artery disease)     Chest pain, mid sternal 5/28/2015    Chronic depression 7/11/2016    Chronic intractable pain 7/11/2016    Chronic left hip pain     Claustrophobia     DDD (degenerative disc disease)     Depression headache     Dizziness     Dysuria     Easy bruising     Fatigue     Female stress incontinence 8/28/2015    Fibromyalgia muscle pain     Fibromyalgia syndrome     Headache     Heat stroke and sunstroke     Hormone replacement therapy     Hot flashes     Hypercholesteremia     Hyperlipemia 5/28/2015    Hypersomnia due to medical condition 5/8/2015    Hypertension     Hypothyroidism     Insomnia     Joint inflammation     Joint pain

## 2024-08-08 RX ORDER — METHYLPREDNISOLONE ACETATE 40 MG/ML
160 INJECTION, SUSPENSION INTRA-ARTICULAR; INTRALESIONAL; INTRAMUSCULAR; SOFT TISSUE ONCE
Status: CANCELLED | OUTPATIENT
Start: 2024-08-08 | End: 2024-08-08

## 2024-08-21 ENCOUNTER — OFFICE VISIT (OUTPATIENT)
Dept: ORTHOPEDIC SURGERY | Age: 64
End: 2024-08-21
Payer: MEDICARE

## 2024-08-21 DIAGNOSIS — F40.240 CLAUSTROPHOBIA: Primary | ICD-10-CM

## 2024-08-21 DIAGNOSIS — M25.511 RIGHT SHOULDER PAIN, UNSPECIFIED CHRONICITY: ICD-10-CM

## 2024-08-21 DIAGNOSIS — M75.101 TEAR OF RIGHT ROTATOR CUFF, UNSPECIFIED TEAR EXTENT, UNSPECIFIED WHETHER TRAUMATIC: ICD-10-CM

## 2024-08-21 DIAGNOSIS — M25.512 BILATERAL SHOULDER PAIN, UNSPECIFIED CHRONICITY: Primary | ICD-10-CM

## 2024-08-21 DIAGNOSIS — M67.922 TENDINOPATHY OF LEFT BICEPS: ICD-10-CM

## 2024-08-21 DIAGNOSIS — M25.511 BILATERAL SHOULDER PAIN, UNSPECIFIED CHRONICITY: Primary | ICD-10-CM

## 2024-08-21 PROCEDURE — 99214 OFFICE O/P EST MOD 30 MIN: CPT | Performed by: ORTHOPAEDIC SURGERY

## 2024-08-21 RX ORDER — ALPRAZOLAM 0.5 MG/1
0.5 TABLET ORAL ONCE AS NEEDED
Qty: 2 TABLET | Refills: 0 | Status: SHIPPED | OUTPATIENT
Start: 2024-08-21 | End: 2024-08-22

## 2024-08-21 NOTE — PROGRESS NOTES
Name: Tyrone Pink  YOB: 1960  Gender: female  MRN: [id number]    CC:   Chief Complaint   Patient presents with    Follow-up     Recheck B/L Shoulder         HPI:   Patient presents for follow-up evaluation of bilateral shoulder pain.  Patient was given a left biceps tendon and right glenohumeral injection on 5- and she notes good relief with injections.  We also discussed physical therapy.  She was unable to go to therapy.  The right shoulder is twinging a little bit but the left shoulder is feeling really good.  Recall shoulder pain began in March without specific mechanism of injury.  Had right shoulder surgery approximately 12 years ago.  No history of left surgery.  Has had history of ACDF in 2011.    No Known Allergies  Past Medical History:   Diagnosis Date    Acne rosacea     Anxiety and depression     Aortic insufficiency 6/1/2016    mild to moderate AI     Atopic dermatitis     Atrophic gastritis without hemorrhage     Bronchitis     CAD (coronary artery disease)     Chest pain, mid sternal 5/28/2015    Chronic depression 7/11/2016    Chronic intractable pain 7/11/2016    Chronic left hip pain     Claustrophobia     DDD (degenerative disc disease)     Depression headache     Dizziness     Dysuria     Easy bruising     Fatigue     Female stress incontinence 8/28/2015    Fibromyalgia muscle pain     Fibromyalgia syndrome     Headache     Heat stroke and sunstroke     Hormone replacement therapy     Hot flashes     Hypercholesteremia     Hyperlipemia 5/28/2015    Hypersomnia due to medical condition 5/8/2015    Hypertension     Hypothyroidism     Insomnia     Joint inflammation     Joint pain     Keratosis, actinic     Major depressive disorder, recurrent episode, moderate with melancholic features ([facility]) 7/11/2016    Menopausal disorder     Migraine     Nocturnal hypoxemia 5/8/2015    NSTEMI (non-ST elevated myocardial infarction) ([facility]) 5/28/2015    Obesity 7/11/2016    Obstructive

## 2024-09-11 ENCOUNTER — OFFICE VISIT (OUTPATIENT)
Dept: ORTHOPEDIC SURGERY | Age: 64
End: 2024-09-11
Payer: MEDICARE

## 2024-09-11 DIAGNOSIS — M25.512 BILATERAL SHOULDER PAIN, UNSPECIFIED CHRONICITY: ICD-10-CM

## 2024-09-11 DIAGNOSIS — M67.922 TENDINOPATHY OF LEFT BICEPS: ICD-10-CM

## 2024-09-11 DIAGNOSIS — M25.511 BILATERAL SHOULDER PAIN, UNSPECIFIED CHRONICITY: ICD-10-CM

## 2024-09-11 DIAGNOSIS — M75.101 TEAR OF RIGHT ROTATOR CUFF, UNSPECIFIED TEAR EXTENT, UNSPECIFIED WHETHER TRAUMATIC: ICD-10-CM

## 2024-09-11 DIAGNOSIS — M25.511 RIGHT SHOULDER PAIN, UNSPECIFIED CHRONICITY: Primary | ICD-10-CM

## 2024-09-11 PROCEDURE — 99214 OFFICE O/P EST MOD 30 MIN: CPT | Performed by: ORTHOPAEDIC SURGERY

## 2024-09-11 PROCEDURE — L3670 SO ACRO/CLAV CAN WEB PRE OTS: HCPCS | Performed by: ORTHOPAEDIC SURGERY

## 2024-09-16 DIAGNOSIS — M75.101 TEAR OF RIGHT ROTATOR CUFF, UNSPECIFIED TEAR EXTENT, UNSPECIFIED WHETHER TRAUMATIC: ICD-10-CM

## 2024-09-16 DIAGNOSIS — M25.511 RIGHT SHOULDER PAIN, UNSPECIFIED CHRONICITY: Primary | ICD-10-CM

## 2024-09-17 ENCOUNTER — TELEPHONE (OUTPATIENT)
Age: 64
End: 2024-09-17

## 2024-09-18 ENCOUNTER — LAB (OUTPATIENT)
Dept: PRIMARY CARE CLINIC | Facility: CLINIC | Age: 64
End: 2024-09-18

## 2024-09-18 DIAGNOSIS — R73.9 HIGH BLOOD SUGAR: ICD-10-CM

## 2024-09-18 DIAGNOSIS — E03.9 HYPOTHYROIDISM, UNSPECIFIED TYPE: ICD-10-CM

## 2024-09-18 DIAGNOSIS — E78.5 HYPERLIPIDEMIA, UNSPECIFIED HYPERLIPIDEMIA TYPE: ICD-10-CM

## 2024-09-18 DIAGNOSIS — I10 HYPERTENSION, UNSPECIFIED TYPE: ICD-10-CM

## 2024-09-18 LAB
ALBUMIN SERPL-MCNC: 3.6 G/DL (ref 3.2–4.6)
ALBUMIN/GLOB SERPL: 1.2 (ref 1–1.9)
ALP SERPL-CCNC: 95 U/L (ref 35–104)
ALT SERPL-CCNC: 15 U/L (ref 12–65)
ANION GAP SERPL CALC-SCNC: 10 MMOL/L (ref 9–18)
AST SERPL-CCNC: 19 U/L (ref 15–37)
BASOPHILS # BLD: 0.1 K/UL (ref 0–0.2)
BASOPHILS NFR BLD: 1 % (ref 0–2)
BILIRUB SERPL-MCNC: 0.4 MG/DL (ref 0–1.2)
BUN SERPL-MCNC: 7 MG/DL (ref 8–23)
CALCIUM SERPL-MCNC: 9.1 MG/DL (ref 8.8–10.2)
CHLORIDE SERPL-SCNC: 104 MMOL/L (ref 98–107)
CHOLEST SERPL-MCNC: 180 MG/DL (ref 0–200)
CO2 SERPL-SCNC: 25 MMOL/L (ref 20–28)
CREAT SERPL-MCNC: 0.76 MG/DL (ref 0.6–1.1)
DIFFERENTIAL METHOD BLD: NORMAL
EOSINOPHIL # BLD: 0.3 K/UL (ref 0–0.8)
EOSINOPHIL NFR BLD: 4 % (ref 0.5–7.8)
ERYTHROCYTE [DISTWIDTH] IN BLOOD BY AUTOMATED COUNT: 13.6 % (ref 11.9–14.6)
EST. AVERAGE GLUCOSE BLD GHB EST-MCNC: 100 MG/DL
GLOBULIN SER CALC-MCNC: 3 G/DL (ref 2.3–3.5)
GLUCOSE SERPL-MCNC: 90 MG/DL (ref 70–99)
HBA1C MFR BLD: 5.1 % (ref 0–5.6)
HCT VFR BLD AUTO: 38.3 % (ref 35.8–46.3)
HDLC SERPL-MCNC: 38 MG/DL (ref 40–60)
HDLC SERPL: 4.7 (ref 0–5)
HGB BLD-MCNC: 12.2 G/DL (ref 11.7–15.4)
IMM GRANULOCYTES # BLD AUTO: 0 K/UL (ref 0–0.5)
IMM GRANULOCYTES NFR BLD AUTO: 0 % (ref 0–5)
LDLC SERPL CALC-MCNC: 116 MG/DL (ref 0–100)
LYMPHOCYTES # BLD: 1.6 K/UL (ref 0.5–4.6)
LYMPHOCYTES NFR BLD: 25 % (ref 13–44)
MCH RBC QN AUTO: 27.5 PG (ref 26.1–32.9)
MCHC RBC AUTO-ENTMCNC: 31.9 G/DL (ref 31.4–35)
MCV RBC AUTO: 86.5 FL (ref 82–102)
MONOCYTES # BLD: 0.4 K/UL (ref 0.1–1.3)
MONOCYTES NFR BLD: 6 % (ref 4–12)
NEUTS SEG # BLD: 4.2 K/UL (ref 1.7–8.2)
NEUTS SEG NFR BLD: 64 % (ref 43–78)
NRBC # BLD: 0 K/UL (ref 0–0.2)
PLATELET # BLD AUTO: 253 K/UL (ref 150–450)
PMV BLD AUTO: 11.8 FL (ref 9.4–12.3)
POTASSIUM SERPL-SCNC: 4.4 MMOL/L (ref 3.5–5.1)
PROT SERPL-MCNC: 6.6 G/DL (ref 6.3–8.2)
RBC # BLD AUTO: 4.43 M/UL (ref 4.05–5.2)
SODIUM SERPL-SCNC: 139 MMOL/L (ref 136–145)
TRIGL SERPL-MCNC: 128 MG/DL (ref 0–150)
TSH, 3RD GENERATION: 1.82 UIU/ML (ref 0.27–4.2)
VLDLC SERPL CALC-MCNC: 26 MG/DL (ref 6–23)
WBC # BLD AUTO: 6.5 K/UL (ref 4.3–11.1)

## 2024-09-25 ENCOUNTER — OFFICE VISIT (OUTPATIENT)
Dept: PRIMARY CARE CLINIC | Facility: CLINIC | Age: 64
End: 2024-09-25
Payer: MEDICARE

## 2024-09-25 VITALS
OXYGEN SATURATION: 98 % | SYSTOLIC BLOOD PRESSURE: 112 MMHG | DIASTOLIC BLOOD PRESSURE: 62 MMHG | HEART RATE: 64 BPM | WEIGHT: 184.2 LBS | HEIGHT: 62 IN | BODY MASS INDEX: 33.9 KG/M2

## 2024-09-25 DIAGNOSIS — I10 HYPERTENSION, UNSPECIFIED TYPE: Primary | ICD-10-CM

## 2024-09-25 DIAGNOSIS — E78.5 HYPERLIPIDEMIA, UNSPECIFIED HYPERLIPIDEMIA TYPE: ICD-10-CM

## 2024-09-25 DIAGNOSIS — G43.819 OTHER MIGRAINE WITHOUT STATUS MIGRAINOSUS, INTRACTABLE: ICD-10-CM

## 2024-09-25 DIAGNOSIS — F33.41 RECURRENT MAJOR DEPRESSIVE DISORDER, IN PARTIAL REMISSION (HCC): ICD-10-CM

## 2024-09-25 DIAGNOSIS — F32.A DEPRESSION, UNSPECIFIED DEPRESSION TYPE: ICD-10-CM

## 2024-09-25 DIAGNOSIS — E03.9 HYPOTHYROIDISM, UNSPECIFIED TYPE: ICD-10-CM

## 2024-09-25 DIAGNOSIS — R73.9 HIGH BLOOD SUGAR: ICD-10-CM

## 2024-09-25 DIAGNOSIS — R63.5 WEIGHT GAIN: ICD-10-CM

## 2024-09-25 PROCEDURE — 99214 OFFICE O/P EST MOD 30 MIN: CPT | Performed by: INTERNAL MEDICINE

## 2024-09-25 PROCEDURE — 3074F SYST BP LT 130 MM HG: CPT | Performed by: INTERNAL MEDICINE

## 2024-09-25 PROCEDURE — 3078F DIAST BP <80 MM HG: CPT | Performed by: INTERNAL MEDICINE

## 2024-09-25 RX ORDER — DULOXETIN HYDROCHLORIDE 60 MG/1
60 CAPSULE, DELAYED RELEASE ORAL DAILY
Qty: 90 CAPSULE | Refills: 1 | Status: SHIPPED | OUTPATIENT
Start: 2024-09-25

## 2024-09-25 RX ORDER — PREDNISOLONE ACETATE 10 MG/ML
SUSPENSION/ DROPS OPHTHALMIC
COMMUNITY
Start: 2024-09-10 | End: 2024-09-25 | Stop reason: ALTCHOICE

## 2024-09-25 RX ORDER — RIZATRIPTAN BENZOATE 10 MG/1
10 TABLET ORAL
Qty: 30 TABLET | Refills: 0 | Status: SHIPPED | OUTPATIENT
Start: 2024-09-25 | End: 2024-09-25

## 2024-09-25 RX ORDER — RIZATRIPTAN BENZOATE 10 MG/1
10 TABLET ORAL
Qty: 30 TABLET | Refills: 0 | Status: CANCELLED | OUTPATIENT
Start: 2024-09-25 | End: 2024-09-25

## 2024-09-25 ASSESSMENT — ENCOUNTER SYMPTOMS: RESPIRATORY NEGATIVE: 1

## 2024-10-14 ENCOUNTER — TELEPHONE (OUTPATIENT)
Age: 64
End: 2024-10-14

## 2024-10-14 ENCOUNTER — OFFICE VISIT (OUTPATIENT)
Age: 64
End: 2024-10-14
Payer: MEDICARE

## 2024-10-14 VITALS
HEIGHT: 62 IN | SYSTOLIC BLOOD PRESSURE: 130 MMHG | DIASTOLIC BLOOD PRESSURE: 80 MMHG | WEIGHT: 184 LBS | BODY MASS INDEX: 33.86 KG/M2 | HEART RATE: 64 BPM

## 2024-10-14 DIAGNOSIS — I25.10 CORONARY ARTERY DISEASE INVOLVING NATIVE CORONARY ARTERY OF NATIVE HEART, UNSPECIFIED WHETHER ANGINA PRESENT: Primary | ICD-10-CM

## 2024-10-14 DIAGNOSIS — E78.5 HYPERLIPIDEMIA, UNSPECIFIED HYPERLIPIDEMIA TYPE: ICD-10-CM

## 2024-10-14 DIAGNOSIS — E78.5 DYSLIPIDEMIA: ICD-10-CM

## 2024-10-14 DIAGNOSIS — I10 ESSENTIAL HYPERTENSION: ICD-10-CM

## 2024-10-14 PROCEDURE — 3075F SYST BP GE 130 - 139MM HG: CPT | Performed by: INTERNAL MEDICINE

## 2024-10-14 PROCEDURE — 99215 OFFICE O/P EST HI 40 MIN: CPT | Performed by: INTERNAL MEDICINE

## 2024-10-14 PROCEDURE — 3079F DIAST BP 80-89 MM HG: CPT | Performed by: INTERNAL MEDICINE

## 2024-10-14 RX ORDER — PRAVASTATIN SODIUM 80 MG/1
80 TABLET ORAL DAILY
Qty: 90 TABLET | Refills: 3 | Status: SHIPPED | OUTPATIENT
Start: 2024-10-14

## 2024-10-14 NOTE — PROGRESS NOTES
status - alert, oriented to person, place, and time  Eyes - pupils equal and reactive, extraocular eye movements intact  Neck/lymph - supple, no significant adenopathy  Chest/lungs - clear to auscultation, no wheezes, rales or rhonchi, symmetric air entry  Heart/CV - normal rate, regular rhythm, normal S1, S2, no murmurs, rubs, clicks or gallops  Abdomen/GI - soft, nontender, nondistended, no masses or organomegaly  Musculoskeletal - no joint tenderness, deformity or swelling  Extremities - peripheral pulses normal, no pedal edema, no clubbing or cyanosis  Skin - normal coloration and turgor, no rashes, no suspicious skin lesions noted    EKG: normal EKG, normal sinus rhythm.         Medications reviewed and questions answered    Recent Results (from the past 672 hour(s))   CBC with Auto Differential    Collection Time: 09/18/24  8:45 AM   Result Value Ref Range    WBC 6.5 4.3 - 11.1 K/uL    RBC 4.43 4.05 - 5.2 M/uL    Hemoglobin 12.2 11.7 - 15.4 g/dL    Hematocrit 38.3 35.8 - 46.3 %    MCV 86.5 82 - 102 FL    MCH 27.5 26.1 - 32.9 PG    MCHC 31.9 31.4 - 35.0 g/dL    RDW 13.6 11.9 - 14.6 %    Platelets 253 150 - 450 K/uL    MPV 11.8 9.4 - 12.3 FL    nRBC 0.00 0.0 - 0.2 K/uL    Differential Type AUTOMATED      Neutrophils % 64 43 - 78 %    Lymphocytes % 25 13 - 44 %    Monocytes % 6 4.0 - 12.0 %    Eosinophils % 4 0.5 - 7.8 %    Basophils % 1 0.0 - 2.0 %    Immature Granulocytes % 0 0.0 - 5.0 %    Neutrophils Absolute 4.2 1.7 - 8.2 K/UL    Lymphocytes Absolute 1.6 0.5 - 4.6 K/UL    Monocytes Absolute 0.4 0.1 - 1.3 K/UL    Eosinophils Absolute 0.3 0.0 - 0.8 K/UL    Basophils Absolute 0.1 0.0 - 0.2 K/UL    Immature Granulocytes Absolute 0.0 0.0 - 0.5 K/UL   TSH    Collection Time: 09/18/24  8:45 AM   Result Value Ref Range    TSH, 3rd Generation 1.820 0.270 - 4.200 uIU/mL   Comprehensive Metabolic Panel    Collection Time: 09/18/24  8:45 AM   Result Value Ref Range    Sodium 139 136 - 145 mmol/L    Potassium 4.4 3.5 -

## 2024-10-14 NOTE — TELEPHONE ENCOUNTER
Cardiac Clearance        Physician or Practice Requesting:[facility]  : Bisi Hayes   Contact Phone Number: [phone]  Fax Number: [phone]  Date of Surgery/Procedure: 10/24/24  Type of Surgery or Procedure: Right Shoulder Scope Rotator cuff repair & Debridement   Type of Anesthesia: Choice/ Regional Block   Type of Clearance Requested: risk assessment and any medication hold   Medication to Hold:?  Days to Hold: ?

## 2024-10-15 NOTE — TELEPHONE ENCOUNTER
Per Dr. Nicholas \"Recent myocardial imaging stress test is normal with normal ejection fraction and no evidence of ischemia patient would be low risk for upcoming surgery there does not appear to be any medications that would need to be held \"    Letter faxed.

## 2024-10-21 ENCOUNTER — TELEPHONE (OUTPATIENT)
Dept: ORTHOPEDIC SURGERY | Age: 64
End: 2024-10-21

## 2024-11-05 DIAGNOSIS — M25.511 RIGHT SHOULDER PAIN, UNSPECIFIED CHRONICITY: ICD-10-CM

## 2024-11-05 DIAGNOSIS — M25.511 BILATERAL SHOULDER PAIN, UNSPECIFIED CHRONICITY: ICD-10-CM

## 2024-11-05 DIAGNOSIS — M75.101 TEAR OF RIGHT ROTATOR CUFF, UNSPECIFIED TEAR EXTENT, UNSPECIFIED WHETHER TRAUMATIC: Primary | ICD-10-CM

## 2024-11-05 DIAGNOSIS — M25.512 BILATERAL SHOULDER PAIN, UNSPECIFIED CHRONICITY: ICD-10-CM

## 2024-11-06 NOTE — PERIOP NOTE
Patient verified name and .  Order for consent not found in EHR; patient verifies procedure.       Type 1B surgery, Phone assessment complete.  Orders not received.  Labs per surgeon: none  Labs per anesthesia protocol: none  Cardiac clearance: in EHR dated 10.14.24.    Please drink 32 ounces of water 2 hours prior to your arrival to avoid dehydration.      Patient answered medical/surgical history questions at their best of ability. All prior to admission medications documented in EPIC.    Patient instructed to continue taking all prescription medications up to the day of surgery but to take only the following medications the day of surgery according to anesthesia guidelines with a small sip of water: Cymbalta, Synthroid, Lopressor, and Pravachol. Also, patient is requested to take 2 Tylenol in the morning and then again before bed on the day before surgery. Regular or extra strength may be used.       Patient informed that all vitamins and supplements should be held 7 days prior to surgery and NSAIDS 5 days prior to surgery. Prescription meds to hold:no additional.    Patient instructed on the following:    > Arrive at [facility] Entrance, time of arrival to be called the day before by 1700  > NPO after midnight, unless otherwise indicated, including gum, mints, and ice chips  > Responsible adult must drive patient to the hospital, stay during surgery, and patient will need supervision 24 hours after anesthesia  > Use non moisturizing soap in shower the night before surgery and on the morning of surgery  > All piercings must be removed prior to arrival.    > Leave all valuables (money and jewelry) at home but bring insurance card and ID on DOS.   > You may be required to pay a deductible or co-pay on the day of your procedure. You can pre-pay by calling [phone] if your surgery is at the [facility] or [phone] if your surgery is at the [facility].  > Do not wear make-up, nail polish, lotions, cologne,

## 2024-11-13 ENCOUNTER — ANESTHESIA EVENT (OUTPATIENT)
Dept: SURGERY | Age: 64
End: 2024-11-13
Payer: MEDICARE

## 2024-11-13 DIAGNOSIS — M75.101 TEAR OF RIGHT ROTATOR CUFF, UNSPECIFIED TEAR EXTENT, UNSPECIFIED WHETHER TRAUMATIC: Primary | ICD-10-CM

## 2024-11-13 RX ORDER — MELOXICAM 7.5 MG/1
7.5 TABLET ORAL 2 TIMES DAILY
Qty: 28 TABLET | Refills: 0 | Status: SHIPPED | OUTPATIENT
Start: 2024-11-13 | End: 2024-11-27

## 2024-11-13 RX ORDER — OXYCODONE AND ACETAMINOPHEN 7.5; 325 MG/1; MG/1
1-2 TABLET ORAL
Qty: 36 TABLET | Refills: 0 | Status: SHIPPED | OUTPATIENT
Start: 2024-11-13 | End: 2024-11-16

## 2024-11-13 RX ORDER — ONDANSETRON 8 MG/1
4 TABLET, ORALLY DISINTEGRATING ORAL EVERY 6 HOURS
Qty: 16 TABLET | Refills: 0 | Status: SHIPPED | OUTPATIENT
Start: 2024-11-13

## 2024-11-13 RX ORDER — ASPIRIN 325 MG
325 TABLET ORAL DAILY
Qty: 7 TABLET | Refills: 0 | Status: SHIPPED | OUTPATIENT
Start: 2024-11-13 | End: 2024-11-20

## 2024-11-13 RX ORDER — AMOXICILLIN 250 MG
1 CAPSULE ORAL DAILY
Qty: 21 TABLET | Refills: 0 | Status: SHIPPED | OUTPATIENT
Start: 2024-11-13

## 2024-11-13 NOTE — DISCHARGE INSTRUCTIONS
Rotator Cuff Repair Postoperative Instructions    Returning Home  Your pain after surgery will vary depending on the method of anesthesia used and from patient to patient. In the first 24 hours, pain medication should be taken regularly with small amounts of food. During this time, nausea and light-headedness are common and should improve in 2-5 days. Drinking fluids may help. If nausea persists, medicine can be prescribed by calling your doctor at [phone].    Leaving the Outpatient Surgery Center:  As you leave the surgery center, you will be in a sling and swath. The sling will have a pillow with it holding your arm away from your body slightly. Plan on wearing the sling for 4-6 weeks after surgery. Make sure that you have a large shirt or a button up shirt to wear home.    For the first week:  Sleeping and resting will be more comfortable if you are propped up in bed or have access to a recliner.  Ice your shoulder to help manage the pain. 30 minutes of ice every hour as needed.  You may come out of the sling 3-5x/day to do elbow, wrist and hand range of motion, and other home exercises (listed further down in - Home Excersizes) so your other joints do not get stiff. Just do not activate or use your shoulder muscles!  Sleep with your sling on.    Sling Use  You will be in the sling for 4 to 6 weeks. You may take the sling off to do your home exercises or physical therapy, or shower, but you need to wear the sling at all other times, even SLEEPING. To put the sling on:    Make sure the pillow of the sling is snug against your side and that your hand and elbow are parallel to the floor.  One strap will go around your waist and connect to the pillow.      Care of Your Incisions  Incisions and stitches are often checked/removed 6 to 10 days after surgery.  Moderate bleeding may occur at the incision sites. This should decrease quickly over time.  Leave the dressings from surgery in place for 48 to 72

## 2024-11-13 NOTE — H&P
Outpatient Surgery History and Physical:  Tyrone Pink was seen and examined.    CHIEF COMPLAINT:   Right shoulder pain.     PE:   /73   Pulse 59   Temp 97.8 °F (36.6 °C) (Oral)   Resp 18   Ht 1.6 m (5' 3\")   Wt 82.6 kg (182 lb)   LMP  (LMP Unknown)   SpO2 97%   BMI 32.24 kg/m²     Heart:   Regular rhythm, regular pulses.    Lungs:  Are clear, non-labored respirations.     Active Problems:    Right shoulder pain    Tear of right rotator cuff  Resolved Problems:    * No resolved hospital problems. *      Past Medical History:    Past Medical History:   Diagnosis Date    Acne rosacea     Anxiety and depression     Aortic insufficiency 6/1/2016    mild to moderate AI     Atopic dermatitis     Atrophic gastritis without hemorrhage     Brain bleed ([facility])     work related injury in 2012    Bronchitis     CAD (coronary artery disease)     Followed by [facility].    Chest pain, mid sternal 5/28/2015    pt denies any recent CP or SOB    Chronic depression 7/11/2016    Chronic intractable pain 7/11/2016    Chronic left hip pain     Claustrophobia     DDD (degenerative disc disease)     Depression headache     Dizziness     Dysuria     Easy bruising     Fatigue     Female stress incontinence 8/28/2015    Fibromyalgia muscle pain     Fibromyalgia syndrome     Headache     Heat stroke and sunstroke     Hormone replacement therapy     Hot flashes     Hypercholesteremia     Hyperlipemia 5/28/2015    Hypersomnia due to medical condition 5/8/2015    Hypertension     Hypothyroidism     Insomnia     Joint inflammation     Joint pain     Keratosis, actinic     Major depressive disorder, recurrent episode, moderate with melancholic features ([facility]) 7/11/2016    Menopausal disorder     Migraine     Nocturnal hypoxemia 5/8/2015    NSTEMI (non-ST elevated myocardial infarction) ([facility]) 5/28/2015    Obesity 7/11/2016    BMI 32    Obstructive sleep apnea 5/8/2015    Osteoarthritis 7/11/2016    knee osteoarthritis     History and Physical.  Patient identified by surgeon; surgical site was confirmed by patient and surgeon.      The patient is here today for outpatient surgery. I have examined the patient, no changes are noted in the patient's medical status. Necessity for the procedure/care is still present and the history and physical above is current.  See the office notes for the full long term history of the problem.  Please see the recent office notes for the musculoskeletal examination. We have already discussed the clinical implications of both conservative and operative treatments. They would like to proceed with operative treatment.  I talked with them extensively about the risks, benefits, reasonable expectations and expected recovery time including long term need for ambulatory assistance as well as possible complications including but not limited to bleeding, infection, need for hardware removal or exchange, neurovascular injury, stiffness, pain, dislocation, continued problems, DVT, PE, hardware failure, other fracture, need for further surgery, heterotopic ossification, MI and other anesthesia related risks, etc. They have exhausted all other options and wish to proceed.   The patient was counseled at length about the risks of mike Covid-19 during their perioperative period and any recovery window from their procedure.  The patient was made aware that mike Covid-19  may worsen their prognosis for recovering from their procedure and lend to a higher morbidity and/or mortality risk.  All material risks, benefits, and reasonable alternatives including postponing the procedure were discussed. The patient does  wish to proceed with the procedure at this time.      Signed By: David Bolden MD     November 14, 2024 7:48 AM

## 2024-11-13 NOTE — PERIOP NOTE
Preop department called to notify patient of arrival time for scheduled procedure. Instructions given to   - Arrive at [facility] Entrance [address].  - No solid food after midnight & Please drink 32 ounces of water 2 hours prior to your arrival to avoid dehydration unless otherwise indicated. No gum, mints, or ice chips.   - Have a responsible adult to drive patient to the hospital, stay during surgery, and patient will need supervision 24 hours after anesthesia.   - Use antibacterial soap in shower the night before surgery and on the morning of surgery.       Was patient contacted: yes-pt   Voicemail left:   Numbers contacted: [phone]   Arrival time: 0700    Time to complete 32 ounces of water: 0500

## 2024-11-14 ENCOUNTER — HOSPITAL ENCOUNTER (OUTPATIENT)
Age: 64
Setting detail: OUTPATIENT SURGERY
Discharge: HOME | End: 2024-11-14
Attending: ORTHOPAEDIC SURGERY | Admitting: ORTHOPAEDIC SURGERY
Payer: MEDICARE

## 2024-11-14 ENCOUNTER — ANESTHESIA (OUTPATIENT)
Dept: SURGERY | Age: 64
End: 2024-11-14
Payer: MEDICARE

## 2024-11-14 VITALS
RESPIRATION RATE: 15 BRPM | TEMPERATURE: 98 F | OXYGEN SATURATION: 91 % | WEIGHT: 182 LBS | DIASTOLIC BLOOD PRESSURE: 56 MMHG | SYSTOLIC BLOOD PRESSURE: 102 MMHG | HEART RATE: 62 BPM | BODY MASS INDEX: 32.25 KG/M2 | HEIGHT: 63 IN

## 2024-11-14 DIAGNOSIS — M67.922 TENDINOPATHY OF LEFT BICEPS: ICD-10-CM

## 2024-11-14 DIAGNOSIS — M25.511 RIGHT SHOULDER PAIN, UNSPECIFIED CHRONICITY: Primary | ICD-10-CM

## 2024-11-14 DIAGNOSIS — M75.101 TEAR OF RIGHT ROTATOR CUFF, UNSPECIFIED TEAR EXTENT, UNSPECIFIED WHETHER TRAUMATIC: ICD-10-CM

## 2024-11-14 PROBLEM — M94.211 CHONDROMALACIA OF RIGHT SHOULDER: Status: ACTIVE | Noted: 2024-11-14

## 2024-11-14 PROBLEM — S43.431A DEGENERATIVE SUPERIOR LABRAL ANTERIOR-TO-POSTERIOR (SLAP) TEAR OF RIGHT SHOULDER: Status: ACTIVE | Noted: 2024-11-14

## 2024-11-14 PROBLEM — S46.119A: Status: ACTIVE | Noted: 2024-11-14

## 2024-11-14 PROCEDURE — 6360000002 HC RX W HCPCS: Performed by: ANESTHESIOLOGY

## 2024-11-14 PROCEDURE — 3700000000 HC ANESTHESIA ATTENDED CARE: Performed by: ORTHOPAEDIC SURGERY

## 2024-11-14 PROCEDURE — 6360000002 HC RX W HCPCS: Performed by: PHYSICIAN ASSISTANT

## 2024-11-14 PROCEDURE — 64415 NJX AA&/STRD BRCH PLXS IMG: CPT | Performed by: ANESTHESIOLOGY

## 2024-11-14 PROCEDURE — 7100000001 HC PACU RECOVERY - ADDTL 15 MIN: Performed by: ORTHOPAEDIC SURGERY

## 2024-11-14 PROCEDURE — 2580000003 HC RX 258

## 2024-11-14 PROCEDURE — 2500000003 HC RX 250 WO HCPCS

## 2024-11-14 PROCEDURE — 3700000001 HC ADD 15 MINUTES (ANESTHESIA): Performed by: ORTHOPAEDIC SURGERY

## 2024-11-14 PROCEDURE — C1713 ANCHOR/SCREW BN/BN,TIS/BN: HCPCS | Performed by: ORTHOPAEDIC SURGERY

## 2024-11-14 PROCEDURE — 6360000002 HC RX W HCPCS: Performed by: ORTHOPAEDIC SURGERY

## 2024-11-14 PROCEDURE — 3600000004 HC SURGERY LEVEL 4 BASE: Performed by: ORTHOPAEDIC SURGERY

## 2024-11-14 PROCEDURE — 29823 SHO ARTHRS SRG XTNSV DBRDMT: CPT | Performed by: ORTHOPAEDIC SURGERY

## 2024-11-14 PROCEDURE — 2500000003 HC RX 250 WO HCPCS: Performed by: ORTHOPAEDIC SURGERY

## 2024-11-14 PROCEDURE — 6370000000 HC RX 637 (ALT 250 FOR IP): Performed by: ANESTHESIOLOGY

## 2024-11-14 PROCEDURE — 2580000003 HC RX 258: Performed by: ANESTHESIOLOGY

## 2024-11-14 PROCEDURE — 29827 SHO ARTHRS SRG RT8TR CUF RPR: CPT | Performed by: ORTHOPAEDIC SURGERY

## 2024-11-14 PROCEDURE — 2580000003 HC RX 258: Performed by: PHYSICIAN ASSISTANT

## 2024-11-14 PROCEDURE — 7100000010 HC PHASE II RECOVERY - FIRST 15 MIN: Performed by: ORTHOPAEDIC SURGERY

## 2024-11-14 PROCEDURE — 2709999900 HC NON-CHARGEABLE SUPPLY: Performed by: ORTHOPAEDIC SURGERY

## 2024-11-14 PROCEDURE — 7100000011 HC PHASE II RECOVERY - ADDTL 15 MIN: Performed by: ORTHOPAEDIC SURGERY

## 2024-11-14 PROCEDURE — 7100000000 HC PACU RECOVERY - FIRST 15 MIN: Performed by: ORTHOPAEDIC SURGERY

## 2024-11-14 PROCEDURE — 6360000002 HC RX W HCPCS

## 2024-11-14 PROCEDURE — 3600000014 HC SURGERY LEVEL 4 ADDTL 15MIN: Performed by: ORTHOPAEDIC SURGERY

## 2024-11-14 DEVICE — SP 2.6 MM KNOTLESS FIBERTAK ANCHOR: Type: IMPLANTABLE DEVICE | Status: FUNCTIONAL

## 2024-11-14 RX ORDER — DEXAMETHASONE SODIUM PHOSPHATE 4 MG/ML
INJECTION, SOLUTION INTRA-ARTICULAR; INTRALESIONAL; INTRAMUSCULAR; INTRAVENOUS; SOFT TISSUE
Status: DISCONTINUED | OUTPATIENT
Start: 2024-11-14 | End: 2024-11-14 | Stop reason: SDUPTHER

## 2024-11-14 RX ORDER — OXYCODONE HYDROCHLORIDE 5 MG/1
10 TABLET ORAL PRN
Status: DISCONTINUED | OUTPATIENT
Start: 2024-11-14 | End: 2024-11-14 | Stop reason: HOSPADM

## 2024-11-14 RX ORDER — FENTANYL CITRATE 50 UG/ML
100 INJECTION, SOLUTION INTRAMUSCULAR; INTRAVENOUS
Status: COMPLETED | OUTPATIENT
Start: 2024-11-14 | End: 2024-11-14

## 2024-11-14 RX ORDER — SODIUM CHLORIDE 9 MG/ML
INJECTION, SOLUTION INTRAVENOUS PRN
Status: DISCONTINUED | OUTPATIENT
Start: 2024-11-14 | End: 2024-11-14 | Stop reason: HOSPADM

## 2024-11-14 RX ORDER — MIDAZOLAM HYDROCHLORIDE 2 MG/2ML
2 INJECTION, SOLUTION INTRAMUSCULAR; INTRAVENOUS
Status: COMPLETED | OUTPATIENT
Start: 2024-11-14 | End: 2024-11-14

## 2024-11-14 RX ORDER — TRANEXAMIC ACID 100 MG/ML
INJECTION, SOLUTION INTRAVENOUS
Status: DISCONTINUED | OUTPATIENT
Start: 2024-11-14 | End: 2024-11-14 | Stop reason: SDUPTHER

## 2024-11-14 RX ORDER — LIDOCAINE HYDROCHLORIDE 10 MG/ML
1 INJECTION, SOLUTION INFILTRATION; PERINEURAL
Status: DISCONTINUED | OUTPATIENT
Start: 2024-11-14 | End: 2024-11-14 | Stop reason: HOSPADM

## 2024-11-14 RX ORDER — EPHEDRINE SULFATE 5 MG/ML
INJECTION INTRAVENOUS
Status: DISCONTINUED | OUTPATIENT
Start: 2024-11-14 | End: 2024-11-14 | Stop reason: SDUPTHER

## 2024-11-14 RX ORDER — NEOSTIGMINE METHYLSULFATE 1 MG/ML
INJECTION INTRAVENOUS
Status: DISCONTINUED | OUTPATIENT
Start: 2024-11-14 | End: 2024-11-14 | Stop reason: SDUPTHER

## 2024-11-14 RX ORDER — SODIUM CHLORIDE 0.9 % (FLUSH) 0.9 %
5-40 SYRINGE (ML) INJECTION PRN
Status: DISCONTINUED | OUTPATIENT
Start: 2024-11-14 | End: 2024-11-14 | Stop reason: HOSPADM

## 2024-11-14 RX ORDER — ROCURONIUM BROMIDE 10 MG/ML
INJECTION, SOLUTION INTRAVENOUS
Status: DISCONTINUED | OUTPATIENT
Start: 2024-11-14 | End: 2024-11-14 | Stop reason: SDUPTHER

## 2024-11-14 RX ORDER — FENTANYL CITRATE 50 UG/ML
INJECTION, SOLUTION INTRAMUSCULAR; INTRAVENOUS
Status: DISCONTINUED | OUTPATIENT
Start: 2024-11-14 | End: 2024-11-14 | Stop reason: SDUPTHER

## 2024-11-14 RX ORDER — HYDROMORPHONE HYDROCHLORIDE 2 MG/ML
0.25 INJECTION, SOLUTION INTRAMUSCULAR; INTRAVENOUS; SUBCUTANEOUS EVERY 5 MIN PRN
Status: DISCONTINUED | OUTPATIENT
Start: 2024-11-14 | End: 2024-11-14 | Stop reason: HOSPADM

## 2024-11-14 RX ORDER — LIDOCAINE HYDROCHLORIDE AND EPINEPHRINE 10; 10 MG/ML; UG/ML
INJECTION, SOLUTION INFILTRATION; PERINEURAL PRN
Status: DISCONTINUED | OUTPATIENT
Start: 2024-11-14 | End: 2024-11-14 | Stop reason: ALTCHOICE

## 2024-11-14 RX ORDER — SODIUM CHLORIDE, SODIUM LACTATE, POTASSIUM CHLORIDE, CALCIUM CHLORIDE 600; 310; 30; 20 MG/100ML; MG/100ML; MG/100ML; MG/100ML
INJECTION, SOLUTION INTRAVENOUS
Status: DISCONTINUED | OUTPATIENT
Start: 2024-11-14 | End: 2024-11-14 | Stop reason: SDUPTHER

## 2024-11-14 RX ORDER — EPINEPHRINE 1 MG/ML(1)
AMPUL (ML) INJECTION PRN
Status: DISCONTINUED | OUTPATIENT
Start: 2024-11-14 | End: 2024-11-14 | Stop reason: ALTCHOICE

## 2024-11-14 RX ORDER — PROPOFOL 10 MG/ML
INJECTION, EMULSION INTRAVENOUS
Status: DISCONTINUED | OUTPATIENT
Start: 2024-11-14 | End: 2024-11-14 | Stop reason: SDUPTHER

## 2024-11-14 RX ORDER — ONDANSETRON 2 MG/ML
INJECTION INTRAMUSCULAR; INTRAVENOUS
Status: DISCONTINUED | OUTPATIENT
Start: 2024-11-14 | End: 2024-11-14 | Stop reason: SDUPTHER

## 2024-11-14 RX ORDER — NALOXONE HYDROCHLORIDE 0.4 MG/ML
INJECTION, SOLUTION INTRAMUSCULAR; INTRAVENOUS; SUBCUTANEOUS PRN
Status: DISCONTINUED | OUTPATIENT
Start: 2024-11-14 | End: 2024-11-14 | Stop reason: HOSPADM

## 2024-11-14 RX ORDER — SODIUM CHLORIDE 0.9 % (FLUSH) 0.9 %
5-40 SYRINGE (ML) INJECTION EVERY 12 HOURS SCHEDULED
Status: DISCONTINUED | OUTPATIENT
Start: 2024-11-14 | End: 2024-11-14 | Stop reason: HOSPADM

## 2024-11-14 RX ORDER — OXYCODONE HYDROCHLORIDE 5 MG/1
5 TABLET ORAL PRN
Status: DISCONTINUED | OUTPATIENT
Start: 2024-11-14 | End: 2024-11-14 | Stop reason: HOSPADM

## 2024-11-14 RX ORDER — DEXAMETHASONE SODIUM PHOSPHATE 10 MG/ML
INJECTION, SOLUTION INTRAMUSCULAR; INTRAVENOUS
Status: COMPLETED | OUTPATIENT
Start: 2024-11-14 | End: 2024-11-14

## 2024-11-14 RX ORDER — PHENYLEPHRINE HYDROCHLORIDE 10 MG/ML
INJECTION INTRAVENOUS
Status: DISCONTINUED | OUTPATIENT
Start: 2024-11-14 | End: 2024-11-14 | Stop reason: SDUPTHER

## 2024-11-14 RX ORDER — PROCHLORPERAZINE EDISYLATE 5 MG/ML
5 INJECTION INTRAMUSCULAR; INTRAVENOUS
Status: DISCONTINUED | OUTPATIENT
Start: 2024-11-14 | End: 2024-11-14 | Stop reason: HOSPADM

## 2024-11-14 RX ORDER — HYDROMORPHONE HYDROCHLORIDE 2 MG/ML
0.5 INJECTION, SOLUTION INTRAMUSCULAR; INTRAVENOUS; SUBCUTANEOUS EVERY 5 MIN PRN
Status: DISCONTINUED | OUTPATIENT
Start: 2024-11-14 | End: 2024-11-14 | Stop reason: HOSPADM

## 2024-11-14 RX ORDER — GLYCOPYRROLATE 0.2 MG/ML
INJECTION INTRAMUSCULAR; INTRAVENOUS
Status: DISCONTINUED | OUTPATIENT
Start: 2024-11-14 | End: 2024-11-14 | Stop reason: SDUPTHER

## 2024-11-14 RX ORDER — ACETAMINOPHEN 500 MG
1000 TABLET ORAL ONCE
Status: COMPLETED | OUTPATIENT
Start: 2024-11-14 | End: 2024-11-14

## 2024-11-14 RX ORDER — ONDANSETRON 2 MG/ML
4 INJECTION INTRAMUSCULAR; INTRAVENOUS
Status: DISCONTINUED | OUTPATIENT
Start: 2024-11-14 | End: 2024-11-14 | Stop reason: HOSPADM

## 2024-11-14 RX ORDER — LIDOCAINE HYDROCHLORIDE 20 MG/ML
INJECTION, SOLUTION EPIDURAL; INFILTRATION; INTRACAUDAL; PERINEURAL
Status: DISCONTINUED | OUTPATIENT
Start: 2024-11-14 | End: 2024-11-14 | Stop reason: SDUPTHER

## 2024-11-14 RX ORDER — SODIUM CHLORIDE, SODIUM LACTATE, POTASSIUM CHLORIDE, CALCIUM CHLORIDE 600; 310; 30; 20 MG/100ML; MG/100ML; MG/100ML; MG/100ML
INJECTION, SOLUTION INTRAVENOUS CONTINUOUS
Status: DISCONTINUED | OUTPATIENT
Start: 2024-11-14 | End: 2024-11-14 | Stop reason: HOSPADM

## 2024-11-14 RX ADMIN — TRANEXAMIC ACID 1000 MG: 100 INJECTION, SOLUTION INTRAVENOUS at 09:39

## 2024-11-14 RX ADMIN — EPHEDRINE SULFATE 10 MG: 5 INJECTION INTRAVENOUS at 09:54

## 2024-11-14 RX ADMIN — LIDOCAINE HYDROCHLORIDE 60 MG: 20 INJECTION, SOLUTION EPIDURAL; INFILTRATION; INTRACAUDAL; PERINEURAL at 09:21

## 2024-11-14 RX ADMIN — PROPOFOL 180 MG: 10 INJECTION, EMULSION INTRAVENOUS at 09:21

## 2024-11-14 RX ADMIN — FENTANYL CITRATE 50 MCG: 50 INJECTION INTRAMUSCULAR; INTRAVENOUS at 08:24

## 2024-11-14 RX ADMIN — PHENYLEPHRINE HYDROCHLORIDE 200 MCG: 10 INJECTION INTRAVENOUS at 09:44

## 2024-11-14 RX ADMIN — ACETAMINOPHEN 1000 MG: 500 TABLET, FILM COATED ORAL at 07:47

## 2024-11-14 RX ADMIN — SODIUM CHLORIDE, SODIUM LACTATE, POTASSIUM CHLORIDE, AND CALCIUM CHLORIDE: 600; 310; 30; 20 INJECTION, SOLUTION INTRAVENOUS at 09:14

## 2024-11-14 RX ADMIN — EPHEDRINE SULFATE 5 MG: 5 INJECTION INTRAVENOUS at 09:49

## 2024-11-14 RX ADMIN — DEXAMETHASONE SODIUM PHOSPHATE 4 MG: 4 INJECTION INTRA-ARTICULAR; INTRALESIONAL; INTRAMUSCULAR; INTRAVENOUS; SOFT TISSUE at 09:41

## 2024-11-14 RX ADMIN — PROPOFOL 20 MG: 10 INJECTION, EMULSION INTRAVENOUS at 10:27

## 2024-11-14 RX ADMIN — NEOSTIGMINE METHYLSULFATE 5 MG: 1 INJECTION, SOLUTION INTRAVENOUS at 10:21

## 2024-11-14 RX ADMIN — PHENYLEPHRINE HYDROCHLORIDE 200 MCG: 10 INJECTION INTRAVENOUS at 09:26

## 2024-11-14 RX ADMIN — PHENYLEPHRINE HYDROCHLORIDE 30 MCG/MIN: 10 INJECTION INTRAVENOUS at 09:59

## 2024-11-14 RX ADMIN — ROCURONIUM BROMIDE 40 MG: 10 INJECTION, SOLUTION INTRAVENOUS at 09:22

## 2024-11-14 RX ADMIN — MIDAZOLAM 1 MG: 1 INJECTION INTRAMUSCULAR; INTRAVENOUS at 08:24

## 2024-11-14 RX ADMIN — PHENYLEPHRINE HYDROCHLORIDE 200 MCG: 10 INJECTION INTRAVENOUS at 09:29

## 2024-11-14 RX ADMIN — CEFAZOLIN 2000 MG: 2 INJECTION, POWDER, FOR SOLUTION INTRAMUSCULAR; INTRAVENOUS at 09:35

## 2024-11-14 RX ADMIN — GLYCOPYRROLATE 0.8 MG: 0.2 INJECTION INTRAMUSCULAR; INTRAVENOUS at 10:21

## 2024-11-14 RX ADMIN — DEXAMETHASONE SODIUM PHOSPHATE 4 MG: 10 INJECTION, SOLUTION INTRAMUSCULAR; INTRAVENOUS at 08:24

## 2024-11-14 RX ADMIN — ONDANSETRON 4 MG: 2 INJECTION INTRAMUSCULAR; INTRAVENOUS at 09:41

## 2024-11-14 RX ADMIN — FENTANYL CITRATE 50 MCG: 50 INJECTION, SOLUTION INTRAMUSCULAR; INTRAVENOUS at 10:28

## 2024-11-14 RX ADMIN — FENTANYL CITRATE 50 MCG: 50 INJECTION, SOLUTION INTRAMUSCULAR; INTRAVENOUS at 09:21

## 2024-11-14 RX ADMIN — ROPIVACAINE HYDROCHLORIDE 30 ML: 5 INJECTION, SOLUTION EPIDURAL; INFILTRATION; PERINEURAL at 08:24

## 2024-11-14 RX ADMIN — ONDANSETRON 4 MG: 2 INJECTION INTRAMUSCULAR; INTRAVENOUS at 10:22

## 2024-11-14 ASSESSMENT — PAIN SCALES - GENERAL: PAINLEVEL_OUTOF10: 0

## 2024-11-14 ASSESSMENT — PAIN - FUNCTIONAL ASSESSMENT: PAIN_FUNCTIONAL_ASSESSMENT: 0-10

## 2024-11-14 NOTE — ANESTHESIA PRE PROCEDURE
MG per tablet Take 1 tablet by mouth daily Take for constipation prophylaxis 11/13/24   Armand Figueroa PA   rizatriptan (MAXALT) 10 MG tablet Take 1 tablet by mouth once as needed for Migraine 9/25/24 10/14/24  Brooke Shafer MD       Current medications:    Current Facility-Administered Medications   Medication Dose Route Frequency Provider Last Rate Last Admin   • ceFAZolin (ANCEF) 2,000 mg in sterile water 20 mL IV syringe  2,000 mg IntraVENous On Call to OR Armand Figueroa PA       • sodium chloride flush 0.9 % injection 5-40 mL  5-40 mL IntraVENous 2 times per day Armand Figueroa PA       • sodium chloride flush 0.9 % injection 5-40 mL  5-40 mL IntraVENous PRN Armand Figueroa PA       • 0.9 % sodium chloride infusion   IntraVENous PRN Armand Figueroa PA       • lidocaine 1 % injection 1 mL  1 mL IntraDERmal Once PRN ANDREW Fish MD       • fentaNYL (SUBLIMAZE) injection 100 mcg  100 mcg IntraVENous Once PRN ANDREW Fish MD       • lactated ringers infusion   IntraVENous Continuous ANDREW Fish MD       • sodium chloride flush 0.9 % injection 5-40 mL  5-40 mL IntraVENous 2 times per day ANDREW Fish MD       • sodium chloride flush 0.9 % injection 5-40 mL  5-40 mL IntraVENous PRN ANDREW Fish MD       • 0.9 % sodium chloride infusion   IntraVENous PRN ANDREW Fish MD       • midazolam PF (VERSED) injection 2 mg  2 mg IntraVENous Once PRN ANDREW Fish MD           Allergies:    Allergies   Allergen Reactions   • Morphine Itching       Problem List:    Patient Active Problem List   Diagnosis Code   • Female stress incontinence N39.3   • Hypothyroidism E03.9   • Chronic depression F32.A   • Chronic intractable pain G89.29   • Fibromyalgia syndrome M79.7   • Migraine G43.909   • Aortic insufficiency I35.1   • Obesity E66.9   • Nocturnal hypoxemia G47.34   • PLMD (periodic limb movement disorder) G47.61   • Hyperlipemia E78.5   • CAD (coronary artery disease) I25.10   •

## 2024-11-14 NOTE — ANESTHESIA PROCEDURE NOTES
Peripheral Block    Patient location during procedure: pre-op  Reason for block: post-op pain management and at surgeon's request  Start time: 11/14/2024 8:24 AM  End time: 11/14/2024 8:28 AM  Staffing  Performed: anesthesiologist   Anesthesiologist: ANDREW Fish MD  Performed by: ANDREW Fish MD  Authorized by: ANDREW Fish MD    Preanesthetic Checklist  Completed: patient identified, IV checked, site marked, risks and benefits discussed, surgical/procedural consents, equipment checked, pre-op evaluation, timeout performed, anesthesia consent given, oxygen available and monitors applied/VS acknowledged  Peripheral Block   Patient position: supine (Semirecumbent)  Prep: ChloraPrep  Provider prep: mask and sterile gloves  Patient monitoring: cardiac monitor, continuous pulse ox, frequent blood pressure checks, IV access, oxygen and responsive to questions  Block type: Brachial plexus  Interscalene  Laterality: right  Injection technique: single-shot  Guidance: nerve stimulator and ultrasound guided    Needle   Needle type: insulated echogenic nerve stimulator needle   Needle gauge: 20 G  Needle localization: anatomical landmarks, nerve stimulator and ultrasound guidance  Assessment   Injection assessment: negative aspiration for heme, no paresthesia on injection, local visualized surrounding nerve on ultrasound and no intravascular symptoms  Slow fractionated injection: yes  Hemodynamics: stable  Outcomes: patient tolerated procedure well    Additional Notes  Local anesthetic visualized surrounding nerves with neural structure intact. Permanent image saved on chart.  0.375% Ropivacaine with epi 1:200,000 30 ml + Decadron 4 mg  Medications Administered  ROPivacaine 0.375% with EPINEPHrine 1:200,000 in NS injection (ANESTHESIA USE) (Mixture components: EPINEPHrine PF 1 MG/ML Soln, 0.005 mL; ROPivacaine 0.5% Soln, 0.75 mL; sodium chloride (PF) 0.9 % Soln, 0.245 mL) - Perineural   30 mL - 11/14/2024

## 2024-11-14 NOTE — ANESTHESIA POSTPROCEDURE EVALUATION
Department of Anesthesiology  Postprocedure Note    Patient: Tyrone Pink  MRN: [id number]  YOB: 1960  Date of evaluation: 11/14/2024    Procedure Summary       Date: 11/14/24 Room / Location: [facility] OP OR 08 / [facility]    Anesthesia Start: 0910 Anesthesia Stop:     Procedure: RIGHT SHOULDER ARTHROSCOPY ROTATOR CUFF REPAIR AND COMPLEX DEBRIDEMENT (Right: Shoulder) Diagnosis:       Right shoulder pain, unspecified chronicity      Tear of right rotator cuff, unspecified tear extent, unspecified whether traumatic      (Right shoulder pain, unspecified chronicity [M25.511])      (Tear of right rotator cuff, unspecified tear extent, unspecified whether traumatic [M75.101])    Surgeons: BATOOL Bolden MD Responsible Provider: ANDREW Fish MD    Anesthesia Type: general ASA Status: 3            Anesthesia Type: No value filed.    Reagan Phase I: Reagan Score: 8    Reagan Phase II:      Anesthesia Post Evaluation    Patient location during evaluation: PACU  Patient participation: complete - patient participated  Level of consciousness: awake and alert  Airway patency: patent  Nausea & Vomiting: no nausea and no vomiting  Cardiovascular status: hemodynamically stable  Respiratory status: acceptable  Hydration status: euvolemic  Comments: Blood pressure (!) 106/55, pulse 65, temperature 98.2 °F (36.8 °C), temperature source Temporal, resp. rate 16, height 1.6 m (5' 3\"), weight 82.6 kg (182 lb), SpO2 94%.    No apparent anesthetic complications.  Pt stable for discharge from PACU  Multimodal analgesia pain management approach  Pain management: adequate    No notable events documented.

## 2024-11-14 NOTE — ANESTHESIA PROCEDURE NOTES
Airway  Date/Time: 11/14/2024 9:25 AM  Urgency: elective    Airway not difficult    General Information and Staff    Patient location during procedure: OR  Performed: resident/CRNA   Performed by: Abiodun Gerber Jr., APRN - CRNA  Authorized by: ANDREW Fish MD      Indications and Patient Condition  Indications for airway management: anesthesia  Spontaneous Ventilation: absent  Sedation level: deep  Preoxygenated: yes  Patient position: sniffing  MILS not maintained throughout  Mask difficulty assessment: vent by bag mask + OA or adjuvant +/- NMBA    Final Airway Details  Final airway type: endotracheal airway      Successful airway: ETT  Cuffed: yes   Successful intubation technique: direct laryngoscopy  Facilitating devices/methods: intubating stylet  Endotracheal tube insertion site: oral  Blade: Celina  Blade size: #3  ETT size (mm): 7.0  Cormack-Lehane Classification: grade I - full view of glottis  Placement verified by: chest auscultation and capnometry   Measured from: lips  ETT to lips (cm): 21  Number of attempts at approach: 1  Ventilation between attempts: bag mask  Number of other approaches attempted: 0    Additional Comments  Lips and dentition remain the same as baseline  no

## 2024-11-14 NOTE — OP NOTE
Operative Note    11/14/2024     Preoperative diagnosis:  Right shoulder pain, unspecified chronicity [M25.511]  Tear of right rotator cuff, unspecified tear extent, unspecified whether traumatic [M75.101]    Postoperative diagnosis: Right incomplete rotator cuff tear, right humeral head malacia, right long head of biceps rupture    Surgeons and Role:     * BATOOL Bolden MD - Primary     Assistant: None    Anesthesia: General,  regional block    Antibiotics: Ancef 2 grams IV    Procedures:  Procedure(s):  RIGHT SHOULDER ARTHROSCOPY ROTATOR CUFF REPAIR AND COMPLEX DEBRIDEMENT   (SMALL)  [id number]    Findings:  1. EUA -   Normal full ROM.  2. Intra-articular -glenoid cartilage was relatively normal.  The humeral head had a central anterior chondral defect which expanded somewhat more anteriorly.  This area showed evidence of grade 3-4 chondromalacia.  Everywhere else looked relatively normal.  Subscap appeared intact.  Biceps had previously ruptured and required debridement of the biceps tendon and the superior labrum.  The articular side of the supraspinatus and infraspinatus was noted to have a high-grade partial tear.  There is a small area at the anterior infraspinatus that may have been full-thickness.  3. Subacromial -mild CA ligament fraying.  No gross bursal sided tearing.  Small area along the anterior infraspinatus that could represent a small remnant tear.  4. AC joint - Not symptomatic pre-operatively.    Indications / Consent:  This is a patient who has persistent pain with weakness in the shoulder.  They have not responded to conservative measures and were  were desirous of evaluation and possible arthroscopic or open repair of the rotator cuff as well as biceps evaluation.  After previous discussions and treatments using both conservative and/or non-operative treatment options the patient elected to proceed with surgery due to continued symptoms.  A review of the risks and benefits, including but not  shoulder.  The patient was placed in a slight external rotation sling.  They were returned to the recovery room in satisfactory condition.  There were no known intraoperative complications. All counts were correct.     Post-operative plan: The patient was placed in an abduction sling and will remain in this for 4-6 weeks. Non weight bearing with their operative arm until otherwise instructed. Post operative instructions are provided. They will begin with instructed ROM exercises and PT once scheduled through my office. I will see them back in approximately 10 days.  She should start therapy within the first week or 2.    Estimated Blood Loss:  Minimal    Fluids:   See anesthesia notes.    Implant:   Implant Name Type Inv. Item Serial No.  Lot No. LRB No. Used Action   SP 2.6 MM KNOTLESS FIBERTAK ANCHOR - [id number]  SP 2.6 MM KNOTLESS FIBERTAK ANCHOR  [employer] [id number] Right 3 Implanted   SP 2.6 MM KNOTLESS FIBERTAK ANCHOR - [id number]  SP 2.6 MM KNOTLESS FIBERTAK ANCHOR  [employer] [id number] Right 1 Implanted       Closure: Primary    Complications: None    Signed By: David Bolden MD

## 2024-11-19 NOTE — PROGRESS NOTES
of  the subacromial space.     Moderate-sized glenohumeral joint effusion with a small amount of fluid in the  subacromial-subdeltoid bursa.       SUBJECTIVE     Chief complaints/history of injury:    Date symptoms began: 11/14/24  Nature of condition: Recent onset (initial onset within last 3 months)  Primary cause of current episode: Post-surgical  How did symptoms start: Insidious onset. She said that she noticed that she lost strength in her arm and that she would notice \"popping\" in her shoulder.  She said that this is her 4th surgery on this shoulder  Describe current symptoms: no pain.  She said that she took \"dual action advil\" this morning before therapy.    Received previous outpatient therapy? No    Pain Assessment:  Pain location: R shoulder, although states she has no pain    Average Pain/symptom intensity (0-10 scale)  Last 24 hours: 0/10  Last week (1-7 days): 10/10  How often do you feel symptoms? Occasionally (26-50%)   Description: aching  Aggravating factors: driving and sleeping. She said that she slept in the bed for the first time, but \"it didn't go well.\" Said that she can only sleep for 2 hour intervals  Alleviating factors: ice, medication: (advil PRN), and avoid aggravating movements    Neuro screen: denies numbness, tingling, and radiating pain    Social/Functional Hx:  How would you rate your overall health? good  Pt lives alone in a(n) 1 story house with entry steps.   Current DME: sling  Work Status: Retired   Sleep: wakes every 2 hours times/night  PLOF & Social Hx/Interests:  She was able to do everything she wanted, but \"would pay for it at night.\"  How much have your symptoms interfered with daily activities? Extremely  Current level of function: modified independent with increased time. She said that when she needs assist, she has a lot of support around her that she can call.    Patient Stated Goals: She would like to be able to throw a soft ball, go back to the \"[employer]\" so that she

## 2024-11-21 ENCOUNTER — EVALUATION (OUTPATIENT)
Age: 64
End: 2024-11-21

## 2024-11-21 DIAGNOSIS — M25.411 EFFUSION OF RIGHT SHOULDER JOINT: ICD-10-CM

## 2024-11-21 DIAGNOSIS — M25.511 ACUTE PAIN OF RIGHT SHOULDER: Primary | ICD-10-CM

## 2024-11-21 DIAGNOSIS — Z74.09 IMPAIRED MOBILITY AND ADLS: ICD-10-CM

## 2024-11-21 DIAGNOSIS — M62.81 GENERALIZED MUSCLE WEAKNESS: ICD-10-CM

## 2024-11-21 DIAGNOSIS — M25.611 STIFFNESS OF RIGHT SHOULDER JOINT: ICD-10-CM

## 2024-11-21 DIAGNOSIS — Z78.9 IMPAIRED MOBILITY AND ADLS: ICD-10-CM

## 2024-11-25 ENCOUNTER — TREATMENT (OUTPATIENT)
Age: 64
End: 2024-11-25
Payer: MEDICARE

## 2024-11-25 DIAGNOSIS — M25.611 STIFFNESS OF RIGHT SHOULDER JOINT: ICD-10-CM

## 2024-11-25 DIAGNOSIS — M25.511 ACUTE PAIN OF RIGHT SHOULDER: Primary | ICD-10-CM

## 2024-11-25 DIAGNOSIS — M62.81 GENERALIZED MUSCLE WEAKNESS: ICD-10-CM

## 2024-11-25 PROCEDURE — 97110 THERAPEUTIC EXERCISES: CPT

## 2024-11-25 PROCEDURE — 97016 VASOPNEUMATIC DEVICE THERAPY: CPT

## 2024-11-25 NOTE — PROGRESS NOTES
[address]   [x] Table slides AAROM x 20   [x] 1/2 foam roller retraction and extension  [] Finger flex/ext  [] Codman's with pt using L hand under R elbow to support and move in CW/CCW motions  [] Therapist adjusted sling for a more proper fit    Vasopneumatic Compression (31456) with cold x 15 minutes: to R shoulder  in order to reduce inflammation and swelling/joint effusion which will help improve ROM and manage pain.        ASSESSMENT     Patient is doing very well. She is not limited by pain or guarding.     PLAN     Continue to progress per Small RCR Protocol and surgeons restrictions (NWB through R UE, wear sling 4-6 weeks)    PLAN OF CARE     Effective Dates/Duration: 11/21/2024 TO 2/19/2025 (90 days).    Frequency: 2x/week   Interventions may include but are not limited to:   (28970) Therapeutic exercise to develop ROM, strength, endurance and flexibility  (86363) Therapeutic activities using dynamic activities to improve function  (75748) Manual therapy techniques to improve joint and/or soft tissue mobility, ROM, and function as well as helping to decrease pain/spasms and swelling  (67239) Self-care/home management training to improve activities of daily living skills and compensatory techniques to achieve independence  Modalities prn to address pain, spasms, and swelling: (89910) Vasopneumatic compression    GOALS     Short term goals to be met by 12/19/2024  (4 weeks):  Pt will demonstrate good recall of HEP requiring minimal verbal cuing for proper form and technique.  Pt will self-report ability to sleep >/= 3 hours in bed without being awakened by shoulder.  Pt will be able to perform household tasks and personal tasks such as dressing and bathing below shoulder level with minimal modifications and minimal shoulder pain.   Pt's PROM  R shoulder: flex >120 deg, scaption > 120 deg, IR to L5, ER > 30 deg to allow pt to dress with less difficulty  Improve QuickDASH Score to </= 65% impairment,

## 2024-11-26 ENCOUNTER — TREATMENT (OUTPATIENT)
Age: 64
End: 2024-11-26
Payer: MEDICARE

## 2024-11-26 DIAGNOSIS — M25.611 STIFFNESS OF RIGHT SHOULDER JOINT: ICD-10-CM

## 2024-11-26 DIAGNOSIS — M25.511 ACUTE PAIN OF RIGHT SHOULDER: Primary | ICD-10-CM

## 2024-11-26 DIAGNOSIS — M62.81 GENERALIZED MUSCLE WEAKNESS: ICD-10-CM

## 2024-11-26 PROCEDURE — 97110 THERAPEUTIC EXERCISES: CPT | Performed by: PHYSICAL THERAPIST

## 2024-11-26 PROCEDURE — 97016 VASOPNEUMATIC DEVICE THERAPY: CPT | Performed by: PHYSICAL THERAPIST

## 2024-11-26 NOTE — PROGRESS NOTES
[facility]  [address] [place] [address]  Dept: [phone]      Physical Therapy Daily Note     Referring MD: BATOOL Bolden MD  Diagnosis:     ICD-10-CM    1. Acute pain of right shoulder  M25.511       2. Stiffness of right shoulder joint  M25.611       3. Generalized muscle weakness  M62.81           Surgery: Right Shoulder Arthroscopy Rotator Cuff Repair And Complex Debridement - Right  Date: 11/14/2024  Therapy precautions: NWB through R UE, wear sling 4-6 weeks  Co-morbidities affecting plan of care: HTN, high cholesterol    Payor: Payor: [employer] /  /  /  Billing pattern: Government- total time   In 835 Out 928  Total Timed Codes: 38 min, Total Treatment Time: 53 min Modifier needed: No    Episode visit count:  3     PERTINENT MEDICAL HISTORY     Patient  has a past medical history of Acne rosacea, Anxiety and depression, Aortic insufficiency, Atopic dermatitis, Atrophic gastritis without hemorrhage, Brain bleed (HCC), Bronchitis, CAD (coronary artery disease), Chest pain, mid sternal, Chronic depression, Chronic intractable pain, Chronic left hip pain, Claustrophobia, DDD (degenerative disc disease), Depression headache, Dizziness, Dysuria, Easy bruising, Fatigue, Female stress incontinence, Fibromyalgia muscle pain, Fibromyalgia syndrome, Headache, Heat stroke and sunstroke, Hormone replacement therapy, Hot flashes, Hypercholesteremia, Hyperlipemia, Hypersomnia due to medical condition, Hypertension, Hypothyroidism, Insomnia, Joint inflammation, Joint pain, Keratosis, actinic, Major depressive disorder, recurrent episode, moderate with melancholic features (HCC), Menopausal disorder, Migraine, Nocturnal hypoxemia, NSTEMI (non-ST elevated myocardial infarction) ([facility]), Obesity, Obstructive sleep apnea, Osteoarthritis, Palpitations, PLMD (periodic limb movement disorder), Sciatic nerve pain, Sinusitis, Sleep apnea, Tachycardia, and Ulcer of finger ([facility]). . Patient  has

## 2024-11-27 ENCOUNTER — OFFICE VISIT (OUTPATIENT)
Dept: ORTHOPEDIC SURGERY | Age: 64
End: 2024-11-27

## 2024-11-27 DIAGNOSIS — M67.922 TENDINOPATHY OF LEFT BICEPS: ICD-10-CM

## 2024-11-27 DIAGNOSIS — Z98.890 S/P RIGHT ROTATOR CUFF REPAIR: ICD-10-CM

## 2024-11-27 DIAGNOSIS — Z09 SURGERY FOLLOW-UP: Primary | ICD-10-CM

## 2024-11-27 NOTE — PROGRESS NOTES
Name: Tyrone Pink  YOB: 1960  Gender: female  MRN: [id number]    CC:   Chief Complaint   Patient presents with    Follow-up     1st p/o R Shoulder scope RCR (small) and complex debridement DOS 11/14/24    1-2 weeks status post   Right Shoulder Arthroscopy Rotator Cuff Repair And Complex Debridement - Right  11/14/2024  Left ant. Shoulder pain    HPI: Patient is status post Right Shoulder Arthroscopy Rotator Cuff Repair And Complex Debridement - Right. Patient is doing well.  Patient came in with a sling on the affected side.   Pain is present on the left anterior shoulder which has been chronic.  Previously in May of this year she had that evaluated initially and an injection done on the biceps.  That helped but only for 2 weeks or so.  Review of Systems  Noncontributory      PE: RS:   Wounds are Clean, Dry and Intact. There is no sign of infection. The patient is neurovascularly intact. Swelling is within normal limits.  LS  Good ROM, and strength.  TTP biceps and + speed/ob.     A/P:     ICD-10-CM    1. Surgery follow-up  Z09       2. S/P right rotator cuff repair  Z98.890       3. Tendinopathy of left biceps  M67.922         Sutures were removed and were covered with Steri-strips.   Discussed HEP.  They will continue with use of the sling at this time.   Given the chronicity and severity of the patient's symptoms, we will obtain an MRI of the left shoulder..  We will see them back after the scan and make a determination regarding further treatment.  If there is a tear or other problem, they may require surgery. If negative, would recommend NSAID's, PT, or injection. They are amenable to this treatment plan.    Return in about 3 weeks (around 12/18/2024) for [facility] slot on Armand's schedule ONLY.      David Bolden MD  11/27/24

## 2024-12-02 ENCOUNTER — TELEPHONE (OUTPATIENT)
Dept: ORTHOPEDIC SURGERY | Age: 64
End: 2024-12-02

## 2024-12-02 NOTE — TELEPHONE ENCOUNTER
MRI LEFT SHOULDER     Summary of Your Request   Please review the details of your request below and if everything looks correct click CONTINUE  Your case has been Approved.  The prior authorization you submitted, Case [id number], has been received. Additional case status notifications will be sent if you opted in for email notifications. Thank you.          Provider Name: NADINE KIRSTIN Contact: kelsie   Provider Address: [address] [place] [address] Phone Number: [phone]      Fax Number: [phone]      Patient Name: PATRICIO KATZ Patient Id: [id number]   Insurance Carrier: [facility]        Site Name: [facility] Site ID: [id number]   Site Address: [address] [place] [address]          Primary Diagnosis Code: M67.922 Description: Unspecified disorder of synovium and tendon, left upper arm   Secondary Diagnosis Code:  Description:    Date of Service: Not provided     CPT Code: 80589 Description: MRI UPPER EXTREMITY JOINT W/O   Authorization Number: [id number]     Case Number: [id number]     Review Date: 12/2/2024 9:26:05 AM     Expiration Date: 5/31/2025     Status: Your case has been Approved.  The prior authorization you submitted, Case [id number], has been received. Additional case status notifications will be sent if you opted in for email notifications. Thank you.

## 2024-12-03 ENCOUNTER — TELEPHONE (OUTPATIENT)
Dept: ORTHOPEDIC SURGERY | Age: 64
End: 2024-12-03

## 2024-12-03 NOTE — TELEPHONE ENCOUNTER
Pt states she called main line and spoke w/\"call services\" yesterday to cancel today's appointment. She is with her father who is in the nursing home. I confirmed her next appointment and she will call our therapy dept # if he is dc and she is able to come later today, if appointment is available.

## 2024-12-05 ENCOUNTER — TREATMENT (OUTPATIENT)
Age: 64
End: 2024-12-05
Payer: MEDICARE

## 2024-12-05 DIAGNOSIS — M25.611 STIFFNESS OF RIGHT SHOULDER JOINT: ICD-10-CM

## 2024-12-05 DIAGNOSIS — M25.511 ACUTE PAIN OF RIGHT SHOULDER: Primary | ICD-10-CM

## 2024-12-05 DIAGNOSIS — M62.81 GENERALIZED MUSCLE WEAKNESS: ICD-10-CM

## 2024-12-05 PROCEDURE — 97016 VASOPNEUMATIC DEVICE THERAPY: CPT

## 2024-12-05 PROCEDURE — 97110 THERAPEUTIC EXERCISES: CPT

## 2024-12-05 NOTE — PROGRESS NOTES
[facility] PT [facility]  [address] [place] [address]  Dept: [phone]      Physical Therapy Daily Note     Referring MD: BATOOL Bolden MD  Diagnosis:     ICD-10-CM    1. Acute pain of right shoulder  M25.511       2. Stiffness of right shoulder joint  M25.611       3. Generalized muscle weakness  M62.81         Surgery: Right Shoulder Arthroscopy Rotator Cuff Repair And Complex Debridement - Right  Date: 11/14/2024  Therapy precautions: NWB through R UE, wear sling 4-6 weeks  Co-morbidities affecting plan of care: HTN, high cholesterol    Payor: Payor: [employer] /  /  /  Billing pattern: Government- total time   In 835 Out 928  Total Timed Codes: 38 min, Total Treatment Time: 53 min Modifier needed: No    Episode visit count:  4     PERTINENT MEDICAL HISTORY     Patient  has a past medical history of Acne rosacea, Anxiety and depression, Aortic insufficiency, Atopic dermatitis, Atrophic gastritis without hemorrhage, Brain bleed (HCC), Bronchitis, CAD (coronary artery disease), Chest pain, mid sternal, Chronic depression, Chronic intractable pain, Chronic left hip pain, Claustrophobia, DDD (degenerative disc disease), Depression headache, Dizziness, Dysuria, Easy bruising, Fatigue, Female stress incontinence, Fibromyalgia muscle pain, Fibromyalgia syndrome, Headache, Heat stroke and sunstroke, Hormone replacement therapy, Hot flashes, Hypercholesteremia, Hyperlipemia, Hypersomnia due to medical condition, Hypertension, Hypothyroidism, Insomnia, Joint inflammation, Joint pain, Keratosis, actinic, Major depressive disorder, recurrent episode, moderate with melancholic features (HCC), Menopausal disorder, Migraine, Nocturnal hypoxemia, NSTEMI (non-ST elevated myocardial infarction) ([facility]), Obesity, Obstructive sleep apnea, Osteoarthritis, Palpitations, PLMD (periodic limb movement disorder), Sciatic nerve pain, Sinusitis, Sleep apnea, Tachycardia, and Ulcer of finger (HCC). . Patient  has a

## 2024-12-08 NOTE — PROGRESS NOTES
[facility] PT [facility]  [address] [facility] [address]  Dept: [phone]      Physical Therapy Daily Note     Referring MD: BATOOL Bolden MD  Diagnosis:     ICD-10-CM    1. Acute pain of right shoulder  M25.511       2. Stiffness of right shoulder joint  M25.611       3. Generalized muscle weakness  M62.81       4. Impaired mobility and ADLs  Z74.09     Z78.9       5. Effusion of right shoulder joint  M25.411         Surgery: Right Shoulder Arthroscopy Rotator Cuff Repair And Complex Debridement - Right  Date: 11/14/2024  Therapy precautions: NWB through R UE, wear sling 4-6 weeks  Co-morbidities affecting plan of care: HTN, high cholesterol    Payor: Payor: [employer] /  /  /  Billing pattern: Government- total time     Total Timed Codes: 45 min, Total Treatment Time: 60 min Modifier needed: No    Episode visit count:  5     PERTINENT MEDICAL HISTORY     Patient  has a past medical history of Acne rosacea, Anxiety and depression, Aortic insufficiency, Atopic dermatitis, Atrophic gastritis without hemorrhage, Brain bleed ([facility]), Bronchitis, CAD (coronary artery disease), Chest pain, mid sternal, Chronic depression, Chronic intractable pain, Chronic left hip pain, Claustrophobia, DDD (degenerative disc disease), Depression headache, Dizziness, Dysuria, Easy bruising, Fatigue, Female stress incontinence, Fibromyalgia muscle pain, Fibromyalgia syndrome, Headache, Heat stroke and sunstroke, Hormone replacement therapy, Hot flashes, Hypercholesteremia, Hyperlipemia, Hypersomnia due to medical condition, Hypertension, Hypothyroidism, Insomnia, Joint inflammation, Joint pain, Keratosis, actinic, Major depressive disorder, recurrent episode, moderate with melancholic features ([facility]), Menopausal disorder, Migraine, Nocturnal hypoxemia, NSTEMI (non-ST elevated myocardial infarction) ([facility]), Obesity, Obstructive sleep apnea, Osteoarthritis, Palpitations, PLMD (periodic limb movement disorder), Sciatic

## 2024-12-09 ENCOUNTER — TREATMENT (OUTPATIENT)
Age: 64
End: 2024-12-09
Payer: MEDICARE

## 2024-12-09 DIAGNOSIS — Z74.09 IMPAIRED MOBILITY AND ADLS: ICD-10-CM

## 2024-12-09 DIAGNOSIS — M25.511 ACUTE PAIN OF RIGHT SHOULDER: Primary | ICD-10-CM

## 2024-12-09 DIAGNOSIS — M25.411 EFFUSION OF RIGHT SHOULDER JOINT: ICD-10-CM

## 2024-12-09 DIAGNOSIS — M62.81 GENERALIZED MUSCLE WEAKNESS: ICD-10-CM

## 2024-12-09 DIAGNOSIS — M25.611 STIFFNESS OF RIGHT SHOULDER JOINT: ICD-10-CM

## 2024-12-09 DIAGNOSIS — Z78.9 IMPAIRED MOBILITY AND ADLS: ICD-10-CM

## 2024-12-09 PROCEDURE — 97110 THERAPEUTIC EXERCISES: CPT | Performed by: PHYSICAL THERAPIST

## 2024-12-09 PROCEDURE — 97016 VASOPNEUMATIC DEVICE THERAPY: CPT | Performed by: PHYSICAL THERAPIST

## 2024-12-11 ENCOUNTER — TREATMENT (OUTPATIENT)
Age: 64
End: 2024-12-11
Payer: MEDICARE

## 2024-12-11 DIAGNOSIS — M25.511 ACUTE PAIN OF RIGHT SHOULDER: Primary | ICD-10-CM

## 2024-12-11 DIAGNOSIS — M25.611 STIFFNESS OF RIGHT SHOULDER JOINT: ICD-10-CM

## 2024-12-11 DIAGNOSIS — M62.81 GENERALIZED MUSCLE WEAKNESS: ICD-10-CM

## 2024-12-11 PROCEDURE — 97016 VASOPNEUMATIC DEVICE THERAPY: CPT

## 2024-12-11 PROCEDURE — 97110 THERAPEUTIC EXERCISES: CPT

## 2024-12-11 NOTE — PROGRESS NOTES
[facility] PT [facility]  [address] [facility] [address]  Dept: [phone]      Physical Therapy Daily Note     Referring MD: BATOOL Bolden MD  Diagnosis:     ICD-10-CM    1. Acute pain of right shoulder  M25.511       2. Stiffness of right shoulder joint  M25.611       3. Generalized muscle weakness  M62.81           Surgery: Right Shoulder Arthroscopy Rotator Cuff Repair And Complex Debridement - Right  Date: 11/14/2024  Therapy precautions: NWB through R UE, wear sling 4-6 weeks  Co-morbidities affecting plan of care: HTN, high cholesterol    Payor: Payor: [employer] /  /  /  Billing pattern: Government- total time     Total Timed Codes: 45 min, Total Treatment Time: 60 min Modifier needed: No    Episode visit count:  Visit count could not be calculated. Make sure you are using a visit which is associated with an episode.     PERTINENT MEDICAL HISTORY     Patient  has a past medical history of Acne rosacea, Anxiety and depression, Aortic insufficiency, Atopic dermatitis, Atrophic gastritis without hemorrhage, Brain bleed ([facility]), Bronchitis, CAD (coronary artery disease), Chest pain, mid sternal, Chronic depression, Chronic intractable pain, Chronic left hip pain, Claustrophobia, DDD (degenerative disc disease), Depression headache, Dizziness, Dysuria, Easy bruising, Fatigue, Female stress incontinence, Fibromyalgia muscle pain, Fibromyalgia syndrome, Headache, Heat stroke and sunstroke, Hormone replacement therapy, Hot flashes, Hypercholesteremia, Hyperlipemia, Hypersomnia due to medical condition, Hypertension, Hypothyroidism, Insomnia, Joint inflammation, Joint pain, Keratosis, actinic, Major depressive disorder, recurrent episode, moderate with melancholic features ([facility]), Menopausal disorder, Migraine, Nocturnal hypoxemia, NSTEMI (non-ST elevated myocardial infarction) ([facility]), Obesity, Obstructive sleep apnea, Osteoarthritis, Palpitations, PLMD (periodic limb movement disorder), Sciatic

## 2024-12-15 NOTE — PROGRESS NOTES
sec x 10  [x] Right shoulder ER, IR isometric walk out w/red tubing x 30  [x] Rhythmic stabilization IR/ER Semi reclined   [x] Scap retraction walk out w/med tubing 5 sec x 10  [x] Bilat shoulder ext walk out w/med tubing 5 sec x 10      Vasopneumatic Compression (85074) with cold x 15 minutes: to R shoulder  in order to reduce inflammation and swelling/joint effusion which will help improve ROM and manage pain.        ASSESSMENT      Pt demonstrates full AROM right elbow flex, no palpable changes in biceps or TTP right bicep. Initiated AROM in supine for scapular support. Demonstrates good tolerance and form. Cuing to keep elbow bent. Lacks EROM shoulder flexion both actively and passively due to anterior shoulder pain. Improves with posterior glide of humeral head.     PLAN     Pt to initiate weaning from sling at home. Assess response. Assess for progress towards STG next visit.      PLAN OF CARE     Effective Dates/Duration: 11/21/2024 TO 2/19/2025 (90 days).    Frequency: 2x/week   Interventions may include but are not limited to:   (15782) Therapeutic exercise to develop ROM, strength, endurance and flexibility  (12380) Therapeutic activities using dynamic activities to improve function  (82284) Manual therapy techniques to improve joint and/or soft tissue mobility, ROM, and function as well as helping to decrease pain/spasms and swelling  (99025) Self-care/home management training to improve activities of daily living skills and compensatory techniques to achieve independence  Modalities prn to address pain, spasms, and swelling: (08365) Vasopneumatic compression    GOALS     Short term goals to be met by 12/19/2024  (4 weeks):  Pt will demonstrate good recall of HEP requiring minimal verbal cuing for proper form and technique.  Pt will self-report ability to sleep >/= 3 hours in bed without being awakened by shoulder.  Pt will be able to perform household tasks and personal tasks such as dressing and bathing

## 2024-12-16 ENCOUNTER — TREATMENT (OUTPATIENT)
Age: 64
End: 2024-12-16
Payer: MEDICARE

## 2024-12-16 DIAGNOSIS — M25.411 EFFUSION OF RIGHT SHOULDER JOINT: ICD-10-CM

## 2024-12-16 DIAGNOSIS — Z78.9 IMPAIRED MOBILITY AND ADLS: ICD-10-CM

## 2024-12-16 DIAGNOSIS — M62.81 GENERALIZED MUSCLE WEAKNESS: ICD-10-CM

## 2024-12-16 DIAGNOSIS — M25.511 ACUTE PAIN OF RIGHT SHOULDER: Primary | ICD-10-CM

## 2024-12-16 DIAGNOSIS — Z74.09 IMPAIRED MOBILITY AND ADLS: ICD-10-CM

## 2024-12-16 DIAGNOSIS — M25.611 STIFFNESS OF RIGHT SHOULDER JOINT: ICD-10-CM

## 2024-12-16 PROCEDURE — 97016 VASOPNEUMATIC DEVICE THERAPY: CPT | Performed by: PHYSICAL THERAPIST

## 2024-12-16 PROCEDURE — 97110 THERAPEUTIC EXERCISES: CPT | Performed by: PHYSICAL THERAPIST

## 2024-12-18 ENCOUNTER — OFFICE VISIT (OUTPATIENT)
Dept: ORTHOPEDIC SURGERY | Age: 64
End: 2024-12-18

## 2024-12-18 DIAGNOSIS — Z09 SURGERY FOLLOW-UP: Primary | ICD-10-CM

## 2024-12-18 DIAGNOSIS — M67.922 TENDINOPATHY OF LEFT BICEPS: ICD-10-CM

## 2024-12-18 DIAGNOSIS — Z98.890 S/P RIGHT ROTATOR CUFF REPAIR: ICD-10-CM

## 2024-12-18 PROCEDURE — 99024 POSTOP FOLLOW-UP VISIT: CPT | Performed by: ORTHOPAEDIC SURGERY

## 2024-12-18 NOTE — PROGRESS NOTES
[employer]/tubing $3 to aid in completion of their home program. Patient notified it is a non-covered item and will not be billed to insurance.     Vasopneumatic Compression (58809) with cold x 15 minutes: to R shoulder  in order to reduce inflammation and swelling/joint effusion which will help improve ROM and manage pain.      ASSESSMENT     Progress Report Period: 11/21/2024 to 12/19/24  As of 12/19/2024, Tyrone Pink has attended 8 PT sessions. Pt's attendance has been consistent with plan of care.  Pt has progressed faster than anticipated with treatment. She has met 4/5 short term goals. She is progressing as expected following surgery. Main complaints include sleeping and restrictions from activity. She does have some soreness that is manageable with OTC medication. Pt has not achieved max rehab potential and would benefit from continued skilled PT to address the above impairments and continue progress towards remaining therapy goals.    PLAN     Pt to initiate weaning from sling at home. Assess response.     PLAN OF CARE     Effective Dates/Duration: 11/21/2024 TO 2/19/2025 (90 days).    Frequency: 2x/week   Interventions may include but are not limited to:   (55088) Therapeutic exercise to develop ROM, strength, endurance and flexibility  (64002) Therapeutic activities using dynamic activities to improve function  (65188) Manual therapy techniques to improve joint and/or soft tissue mobility, ROM, and function as well as helping to decrease pain/spasms and swelling  (05347) Self-care/home management training to improve activities of daily living skills and compensatory techniques to achieve independence  Modalities prn to address pain, spasms, and swelling: (07608) Vasopneumatic compression    GOALS     Short term goals to be met by 12/19/2024  (4 weeks):  Pt will demonstrate good recall of HEP requiring minimal verbal cuing for proper form and technique. Goal Met 12/19/24    Pt will self-report

## 2024-12-18 NOTE — PROGRESS NOTES
Name: Tyrone Pink  YOB: 1960  Gender: female  MRN: [id number]    CC:   Chief Complaint   Patient presents with    Follow-up     2nd p/o R Shoulder scope RCR (small) complex debridement DOS 11/14/24     about 1 month status post  out  Right Shoulder Arthroscopy Rotator Cuff Repair And Complex Debridement - Right  11/14/2024    HPI: Patient is about one month status post Right Shoulder Arthroscopy Rotator Cuff Repair And Complex Debridement - Right. Patient is doing well.  The patient came in wearing a sling on the affected limb. PT-[address]    Allergies   Allergen Reactions    Morphine Itching     Past Medical History:   Diagnosis Date    Acne rosacea     Anxiety and depression     Aortic insufficiency 6/1/2016    mild to moderate AI     Atopic dermatitis     Atrophic gastritis without hemorrhage     Brain bleed ([facility])     work related injury in 2012    Bronchitis     CAD (coronary artery disease)     Followed by [facility].    Chest pain, mid sternal 5/28/2015    pt denies any recent CP or SOB    Chronic depression 7/11/2016    Chronic intractable pain 7/11/2016    Chronic left hip pain     Claustrophobia     DDD (degenerative disc disease)     Depression headache     Dizziness     Dysuria     Easy bruising     Fatigue     Female stress incontinence 8/28/2015    Fibromyalgia muscle pain     Fibromyalgia syndrome     Headache     Heat stroke and sunstroke     Hormone replacement therapy     Hot flashes     Hypercholesteremia     Hyperlipemia 5/28/2015    Hypersomnia due to medical condition 5/8/2015    Hypertension     Hypothyroidism     Insomnia     Joint inflammation     Joint pain     Keratosis, actinic     Major depressive disorder, recurrent episode, moderate with melancholic features ([facility]) 7/11/2016    Menopausal disorder     Migraine     Nocturnal hypoxemia 5/8/2015    NSTEMI (non-ST elevated myocardial infarction) ([facility]) 5/28/2015    Obesity 7/11/2016    BMI 32    Obstructive sleep  unknown

## 2024-12-19 ENCOUNTER — TREATMENT (OUTPATIENT)
Age: 64
End: 2024-12-19

## 2024-12-19 DIAGNOSIS — M25.511 ACUTE PAIN OF RIGHT SHOULDER: Primary | ICD-10-CM

## 2024-12-19 DIAGNOSIS — M25.611 STIFFNESS OF RIGHT SHOULDER JOINT: ICD-10-CM

## 2024-12-19 DIAGNOSIS — M62.81 GENERALIZED MUSCLE WEAKNESS: ICD-10-CM

## 2024-12-19 PROCEDURE — M5044 MISC THERA-BAND/TUBING: HCPCS

## 2024-12-27 ENCOUNTER — TREATMENT (OUTPATIENT)
Age: 64
End: 2024-12-27

## 2024-12-27 DIAGNOSIS — M25.511 ACUTE PAIN OF RIGHT SHOULDER: Primary | ICD-10-CM

## 2024-12-27 DIAGNOSIS — M62.81 GENERALIZED MUSCLE WEAKNESS: ICD-10-CM

## 2024-12-27 DIAGNOSIS — M25.611 STIFFNESS OF RIGHT SHOULDER JOINT: ICD-10-CM

## 2024-12-27 NOTE — PROGRESS NOTES
shoulder to >/= Flexion: 90, Scaption: 90; IR behind back to L5; ER to >35 for improved ability to perform UE and LE dressing, bathing and grooming.  Pt to demonstrate increased R shoulder strength so that she is able to take min resistance to shoulder flex and scaption at 90 deg in standing allowing her to perform ADL's and functional activities with less difficulty.  Pt to be able to drive without difficulty.  Improve QuickDASH Score to </=  50% impairment, demonstrating improved overall function.    Long term goals to be met by 2/19/2025 (90 days):  Pt to subjectively report </= 0-1/10 pain with shoulder movements to allow for return to PLOF.  Demonstrate AROM of involved shoulder to >/= Flexion: 140, Scaption: 140; IR behind back to T12; ER to 45 for improved ability to reach overhead to place objects on shelves.   MMT involved Shoulder to >/= 4/5 allowing for normal lifting, reaching and pushing/pulling associated with ADLs.   Pt to be able to participate in water therapy exercises with min modifications.  Improve QuickDASH Score to </=  30% impairment, demonstrating improved overall function.    [employer]  Access Code: [email or web address]  URL: [email or web address]  Date: 12/19/2024  Prepared by: Jessica Gold    Exercises  - Supine Shoulder Flexion Extension Full Range AROM  - 2 x daily - 2 sets - 10 reps  - Sidelying Shoulder External Rotation AROM  - 2 x daily - 2 sets - 10 reps  - Seated Scapular Retraction  - 2 x daily - 2 sets - 10 reps  - Sleeper Stretch  - 2 x daily - 2 sets - 10 reps  - Shoulder External Rotation Reactive Isometrics  - 1 x daily - 2 sets - 10 reps  - Shoulder Internal Rotation Reactive Isometrics  - 1 x daily - 2 sets - 10 reps  - Standing Shoulder Row with Anchored Resistance  - 1 x daily - 2 sets - 10 reps

## 2024-12-30 ENCOUNTER — TREATMENT (OUTPATIENT)
Age: 64
End: 2024-12-30
Payer: MEDICARE

## 2024-12-30 ENCOUNTER — OFFICE VISIT (OUTPATIENT)
Dept: ORTHOPEDIC SURGERY | Age: 64
End: 2024-12-30
Payer: MEDICARE

## 2024-12-30 DIAGNOSIS — M62.81 GENERALIZED MUSCLE WEAKNESS: ICD-10-CM

## 2024-12-30 DIAGNOSIS — M25.611 STIFFNESS OF RIGHT SHOULDER JOINT: ICD-10-CM

## 2024-12-30 DIAGNOSIS — M25.512 LEFT SHOULDER PAIN, UNSPECIFIED CHRONICITY: ICD-10-CM

## 2024-12-30 DIAGNOSIS — M25.511 ACUTE PAIN OF RIGHT SHOULDER: Primary | ICD-10-CM

## 2024-12-30 DIAGNOSIS — M67.922 TENDINOPATHY OF LEFT BICEPS: Primary | ICD-10-CM

## 2024-12-30 PROCEDURE — 97110 THERAPEUTIC EXERCISES: CPT

## 2024-12-30 PROCEDURE — 99214 OFFICE O/P EST MOD 30 MIN: CPT | Performed by: PHYSICIAN ASSISTANT

## 2024-12-30 PROCEDURE — 97016 VASOPNEUMATIC DEVICE THERAPY: CPT

## 2024-12-30 PROCEDURE — 20610 DRAIN/INJ JOINT/BURSA W/O US: CPT | Performed by: PHYSICIAN ASSISTANT

## 2024-12-30 RX ORDER — METHYLPREDNISOLONE ACETATE 40 MG/ML
80 INJECTION, SUSPENSION INTRA-ARTICULAR; INTRALESIONAL; INTRAMUSCULAR; SOFT TISSUE ONCE
Status: COMPLETED | OUTPATIENT
Start: 2024-12-30 | End: 2024-12-30

## 2024-12-30 RX ADMIN — METHYLPREDNISOLONE ACETATE 80 MG: 40 INJECTION, SUSPENSION INTRA-ARTICULAR; INTRALESIONAL; INTRAMUSCULAR; SOFT TISSUE at 08:09

## 2024-12-30 NOTE — PROGRESS NOTES
[facility]  [address] [place] [address]  Dept: [phone]      Physical Therapy Daily Note     Referring MD: BATOOL Bolden MD  Diagnosis:     ICD-10-CM    1. Acute pain of right shoulder  M25.511       2. Stiffness of right shoulder joint  M25.611       3. Generalized muscle weakness  M62.81         Surgery: Right Shoulder Arthroscopy Rotator Cuff Repair And Complex Debridement - Right  Date: 11/14/2024  Therapy precautions: NWB through R UE, wear sling 4-6 weeks  Co-morbidities affecting plan of care: HTN, high cholesterol    Payor: Payor: [employer] /  /  /  Billing pattern: Government- total time     Total Timed Codes: 40 min, Total Treatment Time: 55 min Modifier needed: No    Episode visit count:  10     PERTINENT MEDICAL HISTORY     Patient  has a past medical history of Acne rosacea, Anxiety and depression, Aortic insufficiency, Atopic dermatitis, Atrophic gastritis without hemorrhage, Brain bleed (HCC), Bronchitis, CAD (coronary artery disease), Chest pain, mid sternal, Chronic depression, Chronic intractable pain, Chronic left hip pain, Claustrophobia, DDD (degenerative disc disease), Depression headache, Dizziness, Dysuria, Easy bruising, Fatigue, Female stress incontinence, Fibromyalgia muscle pain, Fibromyalgia syndrome, Headache, Heat stroke and sunstroke, Hormone replacement therapy, Hot flashes, Hypercholesteremia, Hyperlipemia, Hypersomnia due to medical condition, Hypertension, Hypothyroidism, Insomnia, Joint inflammation, Joint pain, Keratosis, actinic, Major depressive disorder, recurrent episode, moderate with melancholic features (HCC), Menopausal disorder, Migraine, Nocturnal hypoxemia, NSTEMI (non-ST elevated myocardial infarction) ([facility]), Obesity, Obstructive sleep apnea, Osteoarthritis, Palpitations, PLMD (periodic limb movement disorder), Sciatic nerve pain, Sinusitis, Sleep apnea, Tachycardia, and Ulcer of finger ([facility]). . Patient  has a past surgical

## 2024-12-30 NOTE — PROGRESS NOTES
edema.  6.  Small subacromial enthesophyte.       Independently reviewed by myself today: Agree, no significant RTC tear although there is fluid outside of the capsule anteriorly    Tobacco:  reports that she has never smoked. She has never used smokeless tobacco.  Lab Results   Component Value Date    LABA1C 5.1 09/18/2024     Lab Results   Component Value Date    CREATININE 0.76 09/18/2024       A/Plan:     ICD-10-CM    1. Tendinopathy of left biceps  M67.922 methylPREDNISolone acetate (DEPO-MEDROL) injection 80 mg     DRAIN/INJECT LARGE JOINT/BURSA      2. Left shoulder pain, unspecified chronicity  M25.512 methylPREDNISolone acetate (DEPO-MEDROL) injection 80 mg     DRAIN/INJECT LARGE JOINT/BURSA           I discussed with the patient that a lot of her pain is anteriorly over the biceps tendon however she does also have some intra-articular and rotator cuff tendinitis pain as well.  Discussed different types of injections.  We made the decision to proceed with a glenohumeral injection.  We can always proceed with the biceps tendon injection in the future.  We are seeing her back in 4 weeks for the contralateral shoulder at which time we can recheck if her symptoms have resolved or if she has continued anterior pain.    Procedure note: After discussion of risks and benefits including, but not limited to pain, infection, steroid flare, increased blood sugar, fat necrosis, skin discoloration, and injury to blood vessels or nerves, the patient verbally consented to proceed with a glenohumeral joint injection.  They understand that we are using this is an alternative method of treatment and the decision to not proceed with elective major surgery.  We have discussed this decision in detail.  The affected left shoulder was sterilely prepped in standard fashion and injected with 2 cc of depo medrol (40mg/ml), 2 cc of 1% Lidocaine, and 2 cc of 0.5% Marcaine into the joint space.  The patient tolerated the injection well.

## 2025-01-02 ENCOUNTER — TREATMENT (OUTPATIENT)
Age: 65
End: 2025-01-02
Payer: MEDICARE

## 2025-01-02 DIAGNOSIS — M25.611 STIFFNESS OF RIGHT SHOULDER JOINT: ICD-10-CM

## 2025-01-02 DIAGNOSIS — M62.81 GENERALIZED MUSCLE WEAKNESS: ICD-10-CM

## 2025-01-02 DIAGNOSIS — M25.511 ACUTE PAIN OF RIGHT SHOULDER: Primary | ICD-10-CM

## 2025-01-02 PROCEDURE — 97110 THERAPEUTIC EXERCISES: CPT

## 2025-01-02 PROCEDURE — 97140 MANUAL THERAPY 1/> REGIONS: CPT

## 2025-01-02 NOTE — PROGRESS NOTES
surgical history that includes Mohs surgery (Right); Carpal tunnel release (Bilateral); orthopedic surgery (Bilateral); Breast lumpectomy (Right); Cardiac catheterization; cervical fusion; Knee arthroscopy (Right); Hysterectomy; orthopedic surgery (Right); Colonoscopy; eye surgery; and Shoulder arthroscopy (Right, 11/14/2024).   Patient is allergic to morphine.    Diagnostic exams (per chart review): See chart for MRI 8/29/24  No recent imaging (following surgery)     Patient Stated Goals: She would like to be able to throw a soft ball, [facility] so that she can do [facility] exercises without pain.    Initial Evaluation: 11/21/2024  Days Post Op: 49 (6 weeks)    Small RCR Protocol    SUBJECTIVE   Medications: reviewed in chart    Patient reports improved sleep following injection to L shoulder. R shoulder is improving as expected     OBJECTIVE     Findings:   A/PROM Measures:   Shoulder Right Left 12/19/24 R   Flexion 70 P 150 A  125 P   Scaption 30 P 140 A  130 P   IR at 45° abd To stomach WNL  L2 seated A   ER at 45° abd 0 P 40 40P     Treatment provided today:  Manual therapy (56491) x 10 min utilizing techniques to improve joint and/or soft tissue mobility, ROM, and function as well as helping to decrease pain/spasms and swelling.  [x] Palpation and assessment of soft tissue, muscles, and landmarks   [x] SL Scapular mobilizations grade III and shoulder depression  [x] Kinesiotape muscle support technique I strip to upper trap w/ < 50% stretch     Therapeutic exercise (73047) x  30 min to develop ROM, strength, endurance and flexibility.  SDLY passive shoulder protraction/retraction  [x] PROM to R shoulder with pt in semi reclined position following protocol guidelines  [x] AROM shoulder flex in supine 2 x 10  [x] AROM shoulder ER in sidelying 2 x 10 w/towel roll  [] 1/2 foam roller retraction and extension  [x] Pendulum CW/CCW x 30 ea  [x] Blue band rows x 20  [x] Right shoulder ER, IR isometric walk out w/

## 2025-01-03 DIAGNOSIS — I10 HYPERTENSION, UNSPECIFIED TYPE: ICD-10-CM

## 2025-01-03 RX ORDER — METOPROLOL TARTRATE 50 MG
50 TABLET ORAL 2 TIMES DAILY
Qty: 180 TABLET | Refills: 1 | Status: SHIPPED | OUTPATIENT
Start: 2025-01-03

## 2025-01-07 ENCOUNTER — TREATMENT (OUTPATIENT)
Age: 65
End: 2025-01-07
Payer: MEDICARE

## 2025-01-07 DIAGNOSIS — M25.511 ACUTE PAIN OF RIGHT SHOULDER: Primary | ICD-10-CM

## 2025-01-07 DIAGNOSIS — M62.81 GENERALIZED MUSCLE WEAKNESS: ICD-10-CM

## 2025-01-07 DIAGNOSIS — M25.611 STIFFNESS OF RIGHT SHOULDER JOINT: ICD-10-CM

## 2025-01-07 PROCEDURE — 97110 THERAPEUTIC EXERCISES: CPT

## 2025-01-07 PROCEDURE — 97140 MANUAL THERAPY 1/> REGIONS: CPT

## 2025-01-07 NOTE — PROGRESS NOTES
surgical history that includes Mohs surgery (Right); Carpal tunnel release (Bilateral); orthopedic surgery (Bilateral); Breast lumpectomy (Right); Cardiac catheterization; cervical fusion; Knee arthroscopy (Right); Hysterectomy; orthopedic surgery (Right); Colonoscopy; eye surgery; and Shoulder arthroscopy (Right, 11/14/2024).   Patient is allergic to morphine.    Diagnostic exams (per chart review): See chart for MRI 8/29/24  No recent imaging (following surgery)     Patient Stated Goals: She would like to be able to throw a soft ball, go back to the [facility] so that she can do pool exercises without pain.    Initial Evaluation: 11/21/2024  Days Post Op: 54 (>7 weeks)    Small RCR Protocol    SUBJECTIVE   Medications: reviewed in chart    Patient reports improved sleep following injection to L shoulder. R shoulder is improving as expected. She reports improved motion and symptoms with the kinseiotape.     OBJECTIVE     Findings:   A/PROM Measures:   Shoulder Right Left 12/19/24 R   Flexion 70 P 150 A  125 P   Scaption 30 P 140 A  130 P   IR at 45° abd To stomach WNL  L2 seated A   ER at 45° abd 0 P 40 40P     Treatment provided today:  Manual therapy (76640) x 10 min utilizing techniques to improve joint and/or soft tissue mobility, ROM, and function as well as helping to decrease pain/spasms and swelling.  [x] Palpation and assessment of soft tissue, muscles, and landmarks   [x] SL Scapular mobilizations grade III and shoulder depression  [x] Kinesiotape muscle support technique I strip to upper trap w/ < 50% stretch     Therapeutic exercise (19996) x  30 min to develop ROM, strength, endurance and flexibility.  SDLY passive shoulder protraction/retraction  [x] PROM to R shoulder with pt in semi reclined position following protocol guidelines  [x] AROM shoulder flex in supine 2 x 10  [x] AROM shoulder ER, ABD, Horizontal ABD in sidelying 2 x 10 w/towel roll  [] 1/2 foam roller retraction and extension  [x] Pendulum

## 2025-01-14 DIAGNOSIS — E78.5 HYPERLIPIDEMIA, UNSPECIFIED HYPERLIPIDEMIA TYPE: ICD-10-CM

## 2025-01-14 DIAGNOSIS — R63.5 WEIGHT GAIN: ICD-10-CM

## 2025-01-14 DIAGNOSIS — R73.9 HIGH BLOOD SUGAR: ICD-10-CM

## 2025-01-14 DIAGNOSIS — I10 HYPERTENSION, UNSPECIFIED TYPE: ICD-10-CM

## 2025-01-14 LAB
ALBUMIN SERPL-MCNC: 3.7 G/DL (ref 3.2–4.6)
ALBUMIN/GLOB SERPL: 1.2 (ref 1–1.9)
ALP SERPL-CCNC: 98 U/L (ref 35–104)
ALT SERPL-CCNC: 15 U/L (ref 8–45)
ANION GAP SERPL CALC-SCNC: 11 MMOL/L (ref 7–16)
AST SERPL-CCNC: 18 U/L (ref 15–37)
BASOPHILS # BLD: 0.07 K/UL (ref 0–0.2)
BASOPHILS NFR BLD: 0.8 % (ref 0–2)
BILIRUB SERPL-MCNC: 0.4 MG/DL (ref 0–1.2)
BUN SERPL-MCNC: 13 MG/DL (ref 8–23)
CALCIUM SERPL-MCNC: 9.5 MG/DL (ref 8.8–10.2)
CHLORIDE SERPL-SCNC: 103 MMOL/L (ref 98–107)
CHOLEST SERPL-MCNC: 189 MG/DL (ref 0–200)
CO2 SERPL-SCNC: 26 MMOL/L (ref 20–29)
CREAT SERPL-MCNC: 0.91 MG/DL (ref 0.6–1.1)
DIFFERENTIAL METHOD BLD: ABNORMAL
EOSINOPHIL # BLD: 0.26 K/UL (ref 0–0.8)
EOSINOPHIL NFR BLD: 2.8 % (ref 0.5–7.8)
ERYTHROCYTE [DISTWIDTH] IN BLOOD BY AUTOMATED COUNT: 15 % (ref 11.9–14.6)
EST. AVERAGE GLUCOSE BLD GHB EST-MCNC: 117 MG/DL
GLOBULIN SER CALC-MCNC: 3.1 G/DL (ref 2.3–3.5)
GLUCOSE SERPL-MCNC: 79 MG/DL (ref 70–99)
HBA1C MFR BLD: 5.7 % (ref 0–5.6)
HCT VFR BLD AUTO: 38 % (ref 35.8–46.3)
HDLC SERPL-MCNC: 54 MG/DL (ref 40–60)
HDLC SERPL: 3.5 (ref 0–5)
HGB BLD-MCNC: 12.3 G/DL (ref 11.7–15.4)
IMM GRANULOCYTES # BLD AUTO: 0.03 K/UL (ref 0–0.5)
IMM GRANULOCYTES NFR BLD AUTO: 0.3 % (ref 0–5)
LDLC SERPL CALC-MCNC: 115 MG/DL (ref 0–100)
LYMPHOCYTES # BLD: 2.07 K/UL (ref 0.5–4.6)
LYMPHOCYTES NFR BLD: 22.6 % (ref 13–44)
MCH RBC QN AUTO: 27 PG (ref 26.1–32.9)
MCHC RBC AUTO-ENTMCNC: 32.4 G/DL (ref 31.4–35)
MCV RBC AUTO: 83.3 FL (ref 82–102)
MONOCYTES # BLD: 0.47 K/UL (ref 0.1–1.3)
MONOCYTES NFR BLD: 5.1 % (ref 4–12)
NEUTS SEG # BLD: 6.24 K/UL (ref 1.7–8.2)
NEUTS SEG NFR BLD: 68.4 % (ref 43–78)
NRBC # BLD: 0 K/UL (ref 0–0.2)
PLATELET # BLD AUTO: 288 K/UL (ref 150–450)
PMV BLD AUTO: 11.5 FL (ref 9.4–12.3)
POTASSIUM SERPL-SCNC: 4.5 MMOL/L (ref 3.5–5.1)
PROT SERPL-MCNC: 6.8 G/DL (ref 6.3–8.2)
RBC # BLD AUTO: 4.56 M/UL (ref 4.05–5.2)
SODIUM SERPL-SCNC: 140 MMOL/L (ref 136–145)
TRIGL SERPL-MCNC: 100 MG/DL (ref 0–150)
TSH, 3RD GENERATION: 3.7 UIU/ML (ref 0.27–4.2)
VLDLC SERPL CALC-MCNC: 20 MG/DL (ref 6–23)
WBC # BLD AUTO: 9.1 K/UL (ref 4.3–11.1)

## 2025-01-15 ENCOUNTER — TREATMENT (OUTPATIENT)
Age: 65
End: 2025-01-15
Payer: MEDICARE

## 2025-01-15 DIAGNOSIS — M62.81 GENERALIZED MUSCLE WEAKNESS: ICD-10-CM

## 2025-01-15 DIAGNOSIS — M25.511 ACUTE PAIN OF RIGHT SHOULDER: Primary | ICD-10-CM

## 2025-01-15 DIAGNOSIS — M25.611 STIFFNESS OF RIGHT SHOULDER JOINT: ICD-10-CM

## 2025-01-15 PROCEDURE — 97140 MANUAL THERAPY 1/> REGIONS: CPT

## 2025-01-15 PROCEDURE — 97110 THERAPEUTIC EXERCISES: CPT

## 2025-01-15 NOTE — PROGRESS NOTES
Isometrics  - 1 x daily - 2 sets - 10 reps  - Shoulder Internal Rotation Reactive Isometrics  - 1 x daily - 2 sets - 10 reps  - Standing Shoulder Row with Anchored Resistance  - 1 x daily - 2 sets - 10 reps

## 2025-01-16 ENCOUNTER — TREATMENT (OUTPATIENT)
Age: 65
End: 2025-01-16
Payer: MEDICARE

## 2025-01-16 DIAGNOSIS — M25.611 STIFFNESS OF RIGHT SHOULDER JOINT: ICD-10-CM

## 2025-01-16 DIAGNOSIS — M25.511 ACUTE PAIN OF RIGHT SHOULDER: Primary | ICD-10-CM

## 2025-01-16 DIAGNOSIS — M62.81 GENERALIZED MUSCLE WEAKNESS: ICD-10-CM

## 2025-01-16 PROCEDURE — 97016 VASOPNEUMATIC DEVICE THERAPY: CPT

## 2025-01-16 PROCEDURE — 97110 THERAPEUTIC EXERCISES: CPT

## 2025-01-16 PROCEDURE — 97140 MANUAL THERAPY 1/> REGIONS: CPT

## 2025-01-16 NOTE — PROGRESS NOTES
Range AROM  - 2 x daily - 2 sets - 10 reps  - Sidelying Shoulder External Rotation AROM  - 2 x daily - 2 sets - 10 reps  - Seated Scapular Retraction  - 2 x daily - 2 sets - 10 reps  - Sleeper Stretch  - 2 x daily - 2 sets - 10 reps  - Shoulder External Rotation Reactive Isometrics  - 1 x daily - 2 sets - 10 reps  - Shoulder Internal Rotation Reactive Isometrics  - 1 x daily - 2 sets - 10 reps  - Standing Shoulder Row with Anchored Resistance  - 1 x daily - 2 sets - 10 reps

## 2025-01-27 NOTE — PROGRESS NOTES
Name: Tyrone Pink  YOB: 1960  Gender: female  MRN: [id number]    CC:   Chief Complaint   Patient presents with    Follow-up     S/p R Shoulder scope RCR (small) complex debridement DOS 11/14/24    about 2-3 month out from  Right Shoulder Arthroscopy Rotator Cuff Repair And Complex Debridement - Right  11/14/2024    HPI: Patient is status post Right Shoulder Arthroscopy Rotator Cuff Repair And Complex Debridement - Right.   Patient feels as if they are doing well.   They have minimal pain and no complaints.   The patient feels as if they are progressing as expected.    Allergies   Allergen Reactions    Morphine Itching     Past Medical History:   Diagnosis Date    Acne rosacea     Anxiety and depression     Aortic insufficiency 6/1/2016    mild to moderate AI     Atopic dermatitis     Atrophic gastritis without hemorrhage     Brain bleed ([facility])     work related injury in 2012    Bronchitis     CAD (coronary artery disease)     Followed by [facility].    Chest pain, mid sternal 5/28/2015    pt denies any recent CP or SOB    Chronic depression 7/11/2016    Chronic intractable pain 7/11/2016    Chronic left hip pain     Claustrophobia     DDD (degenerative disc disease)     Depression headache     Dizziness     Dysuria     Easy bruising     Fatigue     Female stress incontinence 8/28/2015    Fibromyalgia muscle pain     Fibromyalgia syndrome     Headache     Heat stroke and sunstroke     Hormone replacement therapy     Hot flashes     Hypercholesteremia     Hyperlipemia 5/28/2015    Hypersomnia due to medical condition 5/8/2015    Hypertension     Hypothyroidism     Insomnia     Joint inflammation     Joint pain     Keratosis, actinic     Major depressive disorder, recurrent episode, moderate with melancholic features ([facility]) 7/11/2016    Menopausal disorder     Migraine     Nocturnal hypoxemia 5/8/2015    NSTEMI (non-ST elevated myocardial infarction) ([facility]) 5/28/2015    Obesity 7/11/2016    BMI

## 2025-01-28 ENCOUNTER — TREATMENT (OUTPATIENT)
Age: 65
End: 2025-01-28

## 2025-01-28 DIAGNOSIS — M62.81 GENERALIZED MUSCLE WEAKNESS: ICD-10-CM

## 2025-01-28 DIAGNOSIS — Z78.9 IMPAIRED MOBILITY AND ADLS: ICD-10-CM

## 2025-01-28 DIAGNOSIS — M25.611 STIFFNESS OF RIGHT SHOULDER JOINT: ICD-10-CM

## 2025-01-28 DIAGNOSIS — Z74.09 IMPAIRED MOBILITY AND ADLS: ICD-10-CM

## 2025-01-28 DIAGNOSIS — M25.511 ACUTE PAIN OF RIGHT SHOULDER: Primary | ICD-10-CM

## 2025-01-28 DIAGNOSIS — M25.411 EFFUSION OF RIGHT SHOULDER JOINT: ICD-10-CM

## 2025-01-28 NOTE — PROGRESS NOTES
[facility]  [address] [address]  Dept: [phone]      Physical Therapy Daily Note     Referring MD: BATOOL Bolden MD  Diagnosis:     ICD-10-CM    1. Acute pain of right shoulder  M25.511       2. Stiffness of right shoulder joint  M25.611       3. Generalized muscle weakness  M62.81       4. Effusion of right shoulder joint  M25.411       5. Impaired mobility and ADLs  Z74.09     Z78.9             Surgery: Right Shoulder Arthroscopy Rotator Cuff Repair And Complex Debridement - Right  Date: 11/14/2024  Therapy precautions: NWB through R UE, wear sling 4-6 weeks  Co-morbidities affecting plan of care: HTN, high cholesterol    Payor: Payor: [employer] /  /  /  Billing pattern: Government- total time     Total Timed Codes: 40 min, Total Treatment Time: 40 min Modifier needed: No    Episode visit count:  15     PERTINENT MEDICAL HISTORY     Patient  has a past medical history of Acne rosacea, Anxiety and depression, Aortic insufficiency, Atopic dermatitis, Atrophic gastritis without hemorrhage, Brain bleed ([facility]), Bronchitis, CAD (coronary artery disease), Chest pain, mid sternal, Chronic depression, Chronic intractable pain, Chronic left hip pain, Claustrophobia, DDD (degenerative disc disease), Depression headache, Dizziness, Dysuria, Easy bruising, Fatigue, Female stress incontinence, Fibromyalgia muscle pain, Fibromyalgia syndrome, Headache, Heat stroke and sunstroke, Hormone replacement therapy, Hot flashes, Hypercholesteremia, Hyperlipemia, Hypersomnia due to medical condition, Hypertension, Hypothyroidism, Insomnia, Joint inflammation, Joint pain, Keratosis, actinic, Major depressive disorder, recurrent episode, moderate with melancholic features ([facility]), Menopausal disorder, Migraine, Nocturnal hypoxemia, NSTEMI (non-ST elevated myocardial infarction) ([facility]), Obesity, Obstructive sleep apnea, Osteoarthritis, Palpitations, PLMD (periodic limb movement disorder),

## 2025-01-29 ENCOUNTER — OFFICE VISIT (OUTPATIENT)
Dept: ORTHOPEDIC SURGERY | Age: 65
End: 2025-01-29

## 2025-01-29 DIAGNOSIS — M67.922 TENDINOPATHY OF LEFT BICEPS: ICD-10-CM

## 2025-01-29 DIAGNOSIS — Z98.890 S/P RIGHT ROTATOR CUFF REPAIR: ICD-10-CM

## 2025-01-29 DIAGNOSIS — Z09 SURGERY FOLLOW-UP: Primary | ICD-10-CM

## 2025-01-29 PROCEDURE — 99024 POSTOP FOLLOW-UP VISIT: CPT | Performed by: ORTHOPAEDIC SURGERY

## 2025-01-29 NOTE — PROGRESS NOTES
[facility]  [address] [address]  Dept: [phone]      Physical Therapy Daily Note     Referring MD: BATOOL Bolden MD  Diagnosis:     ICD-10-CM    1. Acute pain of right shoulder  M25.511       2. Stiffness of right shoulder joint  M25.611       3. Generalized muscle weakness  M62.81       4. Effusion of right shoulder joint  M25.411       5. Impaired mobility and ADLs  Z74.09     Z78.9               Surgery: Right Shoulder Arthroscopy Rotator Cuff Repair And Complex Debridement - Right  Date: 11/14/2024  Therapy precautions: NWB through R UE, wear sling 4-6 weeks  Co-morbidities affecting plan of care: HTN, high cholesterol    Payor: Payor: [employer] /  /  /  Billing pattern: Government- total time     Total Timed Codes: 45 min, Total Treatment Time: 47 min Modifier needed: No    Episode visit count:  16     PERTINENT MEDICAL HISTORY     Patient  has a past medical history of Acne rosacea, Anxiety and depression, Aortic insufficiency, Atopic dermatitis, Atrophic gastritis without hemorrhage, Brain bleed ([facility]), Bronchitis, CAD (coronary artery disease), Chest pain, mid sternal, Chronic depression, Chronic intractable pain, Chronic left hip pain, Claustrophobia, DDD (degenerative disc disease), Depression headache, Dizziness, Dysuria, Easy bruising, Fatigue, Female stress incontinence, Fibromyalgia muscle pain, Fibromyalgia syndrome, Headache, Heat stroke and sunstroke, Hormone replacement therapy, Hot flashes, Hypercholesteremia, Hyperlipemia, Hypersomnia due to medical condition, Hypertension, Hypothyroidism, Insomnia, Joint inflammation, Joint pain, Keratosis, actinic, Major depressive disorder, recurrent episode, moderate with melancholic features ([facility]), Menopausal disorder, Migraine, Nocturnal hypoxemia, NSTEMI (non-ST elevated myocardial infarction) ([facility]), Obesity, Obstructive sleep apnea, Osteoarthritis, Palpitations, PLMD (periodic limb movement disorder),

## 2025-01-30 ENCOUNTER — TREATMENT (OUTPATIENT)
Age: 65
End: 2025-01-30

## 2025-01-30 DIAGNOSIS — M25.411 EFFUSION OF RIGHT SHOULDER JOINT: ICD-10-CM

## 2025-01-30 DIAGNOSIS — Z74.09 IMPAIRED MOBILITY AND ADLS: ICD-10-CM

## 2025-01-30 DIAGNOSIS — Z78.9 IMPAIRED MOBILITY AND ADLS: ICD-10-CM

## 2025-01-30 DIAGNOSIS — M25.511 ACUTE PAIN OF RIGHT SHOULDER: Primary | ICD-10-CM

## 2025-01-30 DIAGNOSIS — M25.611 STIFFNESS OF RIGHT SHOULDER JOINT: ICD-10-CM

## 2025-01-30 DIAGNOSIS — M62.81 GENERALIZED MUSCLE WEAKNESS: ICD-10-CM

## 2025-02-07 DIAGNOSIS — I10 HYPERTENSION, UNSPECIFIED TYPE: ICD-10-CM

## 2025-02-07 RX ORDER — LISINOPRIL 10 MG/1
10 TABLET ORAL DAILY
Qty: 90 TABLET | Refills: 1 | OUTPATIENT
Start: 2025-02-07

## 2025-02-11 ENCOUNTER — OFFICE VISIT (OUTPATIENT)
Age: 65
End: 2025-02-11
Payer: MEDICARE

## 2025-02-11 VITALS
WEIGHT: 185 LBS | SYSTOLIC BLOOD PRESSURE: 108 MMHG | HEIGHT: 63 IN | BODY MASS INDEX: 32.78 KG/M2 | DIASTOLIC BLOOD PRESSURE: 68 MMHG | HEART RATE: 68 BPM

## 2025-02-11 DIAGNOSIS — I25.10 CORONARY ARTERY DISEASE INVOLVING NATIVE CORONARY ARTERY OF NATIVE HEART WITHOUT ANGINA PECTORIS: ICD-10-CM

## 2025-02-11 DIAGNOSIS — I35.1 NONRHEUMATIC AORTIC VALVE INSUFFICIENCY: Primary | ICD-10-CM

## 2025-02-11 DIAGNOSIS — I10 PRIMARY HYPERTENSION: ICD-10-CM

## 2025-02-11 DIAGNOSIS — G47.33 OBSTRUCTIVE SLEEP APNEA: ICD-10-CM

## 2025-02-11 PROCEDURE — 1036F TOBACCO NON-USER: CPT | Performed by: INTERNAL MEDICINE

## 2025-02-11 PROCEDURE — 3078F DIAST BP <80 MM HG: CPT | Performed by: INTERNAL MEDICINE

## 2025-02-11 PROCEDURE — 99214 OFFICE O/P EST MOD 30 MIN: CPT | Performed by: INTERNAL MEDICINE

## 2025-02-11 PROCEDURE — 3017F COLORECTAL CA SCREEN DOC REV: CPT | Performed by: INTERNAL MEDICINE

## 2025-02-11 PROCEDURE — G8428 CUR MEDS NOT DOCUMENT: HCPCS | Performed by: INTERNAL MEDICINE

## 2025-02-11 PROCEDURE — G8417 CALC BMI ABV UP PARAM F/U: HCPCS | Performed by: INTERNAL MEDICINE

## 2025-02-11 PROCEDURE — 3074F SYST BP LT 130 MM HG: CPT | Performed by: INTERNAL MEDICINE

## 2025-02-11 NOTE — PROGRESS NOTES
[facility]  [address] [place] [address], [address]  [place] [address]  PHONE: [phone]    SUBJECTIVE: /HPI  Tyrone Pink (1960) is a 64 y.o. female seen for a follow up visit regarding the following:   Specialty Problems          Cardiology Problems    Chest pain, mid sternal        Hyperlipemia        Hypertension        NSTEMI (non-ST elevated myocardial infarction) (HCC)        Aortic insufficiency        CAD (coronary artery disease)        Migraine         Patient in for routine follow-up endorses she is doing well with no specific complaints.  Underwent an HAIDER duplex in November 2024 that was unremarkable she also had in September 2020 for a nuclear stress test that was unremarkable and an echocardiogram in August 2024 that showed normal ejection fraction aortic sclerosis but no significant AI    Past Medical History, Past Surgical History, Family history, Social History, and Medications were all reviewed with the patient today and updated as necessary.    Allergies   Allergen Reactions    Morphine Itching     Past Medical History:   Diagnosis Date    Acne rosacea     Anxiety and depression     Aortic insufficiency 6/1/2016    mild to moderate AI     Atopic dermatitis     Atrophic gastritis without hemorrhage     Brain bleed (HCC)     work related injury in 2012    Bronchitis     CAD (coronary artery disease)     Followed by [facility].    Chest pain, mid sternal 5/28/2015    pt denies any recent CP or SOB    Chronic depression 7/11/2016    Chronic intractable pain 7/11/2016    Chronic left hip pain     Claustrophobia     DDD (degenerative disc disease)     Depression headache     Dizziness     Dysuria     Easy bruising     Fatigue     Female stress incontinence 8/28/2015    Fibromyalgia muscle pain     Fibromyalgia syndrome     Headache     Heat stroke and sunstroke     Hormone replacement therapy     Hot flashes     Hypercholesteremia     Hyperlipemia 5/28/2015    Hypersomnia due to

## 2025-02-12 ENCOUNTER — TREATMENT (OUTPATIENT)
Age: 65
End: 2025-02-12

## 2025-02-12 DIAGNOSIS — M62.81 GENERALIZED MUSCLE WEAKNESS: ICD-10-CM

## 2025-02-12 DIAGNOSIS — M25.611 STIFFNESS OF RIGHT SHOULDER JOINT: ICD-10-CM

## 2025-02-12 DIAGNOSIS — M25.511 ACUTE PAIN OF RIGHT SHOULDER: Primary | ICD-10-CM

## 2025-02-12 NOTE — PROGRESS NOTES
thoracic mobilizations in prone; , bilateral and corkscrew method, to improve mobility and decrease pain.    Therapeutic exercise (16715) x  40 min to develop ROM, strength, endurance and flexibility.  [] ROM assessment as above  [] SDLY passive shoulder protraction/retraction  [] PROM to R shoulder   [] AROM shoulder flex in supine 2 x 10  [x] 1/2 foam roller retraction and extension  [] Pendulum CW/CCW x 30 ea  [x] V Light  band rows/extension/IR/ER x 20  [] Right shoulder ER, IR isometric walk out w/ green tubing x 30  [] Wall slides w/ ER stretch x 15   [] Rhythmic stabilization IR/ER Semi reclined   [] Sleeper stretch x 20   [x] SDLY shoulder horizontal abd, ER with end range isometric, and abd 2x10 1#   [] Pulleys x 20 flexion and 20 scaption   [x] CW/CCW Ball on wall circles 2 x 20 each   [] Seated dowel flexion and scaption x 15   [] Seated no moneys green x 15   [] Bent row/ ext/ horiz abd   [] Flexion bar walkouts x 15       ASSESSMENT     Patient did well with progression of strengthening and demonstrates improved scapulothoracic mobility this session. She does continue to have difficulty with endurance and overhead/behind the back motions.     PLAN     On next visit, assess effects of last treatment. Have pt fill out Quick DASH to determine if she has met goal.  Continue with thoracic and scapulothoracic mobilizations  Taping if beneficial  GHJ joint mobs  Shoulder strengthening and stabilization    PLAN OF CARE     Effective Dates/Duration: 11/21/2024 TO 2/19/2025 (90 days).    Frequency: 2x/week   Interventions may include but are not limited to:   (58239) Therapeutic exercise to develop ROM, strength, endurance and flexibility  (55901) Therapeutic activities using dynamic activities to improve function  (94498) Manual therapy techniques to improve joint and/or soft tissue mobility, ROM, and function as well as helping to decrease pain/spasms and swelling  (21086) Self-care/home management training to

## 2025-02-19 ENCOUNTER — TREATMENT (OUTPATIENT)
Age: 65
End: 2025-02-19
Payer: MEDICARE

## 2025-02-19 DIAGNOSIS — M62.81 GENERALIZED MUSCLE WEAKNESS: ICD-10-CM

## 2025-02-19 DIAGNOSIS — M25.511 ACUTE PAIN OF RIGHT SHOULDER: Primary | ICD-10-CM

## 2025-02-19 DIAGNOSIS — M25.611 STIFFNESS OF RIGHT SHOULDER JOINT: ICD-10-CM

## 2025-02-19 PROCEDURE — 97016 VASOPNEUMATIC DEVICE THERAPY: CPT

## 2025-02-19 PROCEDURE — 97140 MANUAL THERAPY 1/> REGIONS: CPT

## 2025-02-19 PROCEDURE — 97110 THERAPEUTIC EXERCISES: CPT

## 2025-02-19 NOTE — PROGRESS NOTES
[facility]  [address] [place] [address]  Dept: [phone]      Physical Therapy Daily Note     Referring MD: BATOOL Bolden MD  Diagnosis:     ICD-10-CM    1. Acute pain of right shoulder  M25.511       2. Stiffness of right shoulder joint  M25.611       3. Generalized muscle weakness  M62.81           Surgery: Right Shoulder Arthroscopy Rotator Cuff Repair And Complex Debridement - Right  Date: 11/14/2024  Therapy precautions: NWB through R UE, wear sling 4-6 weeks  Co-morbidities affecting plan of care: HTN, high cholesterol    Payor: Payor: [employer] /  /  /  Billing pattern: Government- total time     Total Timed Codes: 50 min, Total Treatment Time: 65 min Modifier needed: No    Episode visit count:  18     PERTINENT MEDICAL HISTORY     Patient  has a past medical history of Acne rosacea, Anxiety and depression, Aortic insufficiency, Atopic dermatitis, Atrophic gastritis without hemorrhage, Brain bleed (HCC), Bronchitis, CAD (coronary artery disease), Chest pain, mid sternal, Chronic depression, Chronic intractable pain, Chronic left hip pain, Claustrophobia, DDD (degenerative disc disease), Depression headache, Dizziness, Dysuria, Easy bruising, Fatigue, Female stress incontinence, Fibromyalgia muscle pain, Fibromyalgia syndrome, Headache, Heat stroke and sunstroke, Hormone replacement therapy, Hot flashes, Hypercholesteremia, Hyperlipemia, Hypersomnia due to medical condition, Hypertension, Hypothyroidism, Insomnia, Joint inflammation, Joint pain, Keratosis, actinic, Major depressive disorder, recurrent episode, moderate with melancholic features (HCC), Menopausal disorder, Migraine, Nocturnal hypoxemia, NSTEMI (non-ST elevated myocardial infarction) ([facility]), Obesity, Obstructive sleep apnea, Osteoarthritis, Palpitations, PLMD (periodic limb movement disorder), Sciatic nerve pain, Sinusitis, Sleep apnea, Tachycardia, and Ulcer of finger ([facility]). . Patient  has a past

## 2025-02-26 ENCOUNTER — TREATMENT (OUTPATIENT)
Age: 65
End: 2025-02-26
Payer: MEDICARE

## 2025-02-26 DIAGNOSIS — M62.81 GENERALIZED MUSCLE WEAKNESS: ICD-10-CM

## 2025-02-26 DIAGNOSIS — M25.611 STIFFNESS OF RIGHT SHOULDER JOINT: ICD-10-CM

## 2025-02-26 DIAGNOSIS — M25.511 ACUTE PAIN OF RIGHT SHOULDER: Primary | ICD-10-CM

## 2025-02-26 PROCEDURE — 97110 THERAPEUTIC EXERCISES: CPT

## 2025-02-26 PROCEDURE — 97140 MANUAL THERAPY 1/> REGIONS: CPT

## 2025-02-26 NOTE — PROGRESS NOTES
surgical history that includes Mohs surgery (Right); Carpal tunnel release (Bilateral); orthopedic surgery (Bilateral); Breast lumpectomy (Right); Cardiac catheterization; cervical fusion; Knee arthroscopy (Right); Hysterectomy; orthopedic surgery (Right); Colonoscopy; eye surgery; and Shoulder arthroscopy (Right, 11/14/2024).   Patient is allergic to morphine.    Diagnostic exams (per chart review): See chart for MRI 8/29/24  No recent imaging (following surgery)     Patient Stated Goals: She would like to be able to throw a soft ball, [facility] to the [facility] so that she can do pool exercises without pain.    Initial Evaluation: 11/21/2024  Days Post Op: 104 (>8 weeks)    Small RCR Protocol    SUBJECTIVE   Medications: reviewed in chart    Patient reports : Patient reports continued improvements. [facility] She did have some soreness after lifting something that was heavier than she thought it would be.     OBJECTIVE     Findings:   Pt has thoracic facet hypomobility R>L, hypomobility in scapulothoracic movement on R  A/PROM Measures:   Shoulder Right Left 12/19/24 R 1/30/25   Flexion 70 P 150 A  125 P 147 A   Scaption 30 P 140 A  130 P 152 A   IR at 45° abd To stomach WNL  L2 seated A T8   ER at 45° abd 0 P 40 40P    extension    56 A     Strength/MMT (0-5) 2/19/2025    Right 2/19/2025   Left Comments:   Shoulder       Flexion 4     Scaption 4     IR  4+     ER 3+        11/21/2024 2/12/25   QuickDASH:  82% functional deficit  QuickDASH: 29.5% functional deficit     Treatment provided today:  Manual therapy (05595) x 10 min utilizing techniques to improve joint and/or soft tissue mobility, ROM, and function as well as helping to decrease pain/spasms and swelling.  [x] Palpation and assessment of soft tissue, muscles, and landmarks   [] SL Scapular mobilizations grade III and shoulder depression  [x] Posterior, inf and ant GHJ glides grade II-III  [] STM to

## 2025-02-28 DIAGNOSIS — E03.9 HYPOTHYROIDISM, UNSPECIFIED TYPE: ICD-10-CM

## 2025-02-28 RX ORDER — LEVOTHYROXINE SODIUM 75 UG/1
75 TABLET ORAL DAILY
Qty: 30 TABLET | Refills: 0 | Status: SHIPPED | OUTPATIENT
Start: 2025-02-28

## 2025-03-12 ENCOUNTER — TREATMENT (OUTPATIENT)
Age: 65
End: 2025-03-12

## 2025-03-12 DIAGNOSIS — M62.81 GENERALIZED MUSCLE WEAKNESS: ICD-10-CM

## 2025-03-12 DIAGNOSIS — M25.511 ACUTE PAIN OF RIGHT SHOULDER: Primary | ICD-10-CM

## 2025-03-12 DIAGNOSIS — M25.611 STIFFNESS OF RIGHT SHOULDER JOINT: ICD-10-CM

## 2025-03-12 NOTE — PROGRESS NOTES
[facility]  [address] [place] [address]  Dept: [phone]      Physical Therapy Daily Note     Referring MD: BATOOL Bolden MD  Diagnosis:     ICD-10-CM    1. Acute pain of right shoulder  M25.511       2. Stiffness of right shoulder joint  M25.611       3. Generalized muscle weakness  M62.81             Surgery: Right Shoulder Arthroscopy Rotator Cuff Repair And Complex Debridement - Right  Date: 11/14/2024  Therapy precautions: NWB through R UE, wear sling 4-6 weeks  Co-morbidities affecting plan of care: HTN, high cholesterol    Payor: Payor: [employer] /  /  /  Billing pattern: Government- total time     Total Timed Codes: 55 min, Total Treatment Time: 55 min Modifier needed: No    Episode visit count:  20     PERTINENT MEDICAL HISTORY     Patient  has a past medical history of Acne rosacea, Anxiety and depression, Aortic insufficiency, Atopic dermatitis, Atrophic gastritis without hemorrhage, Brain bleed (HCC), Bronchitis, CAD (coronary artery disease), Chest pain, mid sternal, Chronic depression, Chronic intractable pain, Chronic left hip pain, Claustrophobia, DDD (degenerative disc disease), Depression headache, Dizziness, Dysuria, Easy bruising, Fatigue, Female stress incontinence, Fibromyalgia muscle pain, Fibromyalgia syndrome, Headache, Heat stroke and sunstroke, Hormone replacement therapy, Hot flashes, Hypercholesteremia, Hyperlipemia, Hypersomnia due to medical condition, Hypertension, Hypothyroidism, Insomnia, Joint inflammation, Joint pain, Keratosis, actinic, Major depressive disorder, recurrent episode, moderate with melancholic features (HCC), Menopausal disorder, Migraine, Nocturnal hypoxemia, NSTEMI (non-ST elevated myocardial infarction) ([facility] [place] [facility]), Obesity, Obstructive sleep apnea, Osteoarthritis, Palpitations, PLMD (periodic limb movement disorder), Sciatic nerve pain, Sinusitis, Sleep apnea, Tachycardia, and Ulcer of finger ([facility] [place] [facility]). . Patient  has a past

## 2025-03-25 ENCOUNTER — TREATMENT (OUTPATIENT)
Age: 65
End: 2025-03-25
Payer: MEDICARE

## 2025-03-25 DIAGNOSIS — M25.611 STIFFNESS OF RIGHT SHOULDER JOINT: ICD-10-CM

## 2025-03-25 DIAGNOSIS — M25.511 ACUTE PAIN OF RIGHT SHOULDER: Primary | ICD-10-CM

## 2025-03-25 DIAGNOSIS — M62.81 GENERALIZED MUSCLE WEAKNESS: ICD-10-CM

## 2025-03-25 PROCEDURE — 97530 THERAPEUTIC ACTIVITIES: CPT

## 2025-03-25 PROCEDURE — 97110 THERAPEUTIC EXERCISES: CPT

## 2025-03-25 NOTE — PROGRESS NOTES
to subjectively report </= 0-1/10 pain with shoulder movements to allow for return to PLOF. Partially Met 2/19/25  2/10  Demonstrate AROM of involved shoulder to >/= Flexion: 140, Scaption: 140; IR behind back to T12; ER to 45 for improved ability to reach overhead to place objects on shelves. Goal Met 2/19/25    MMT involved Shoulder to >/= 4/5 allowing for normal lifting, reaching and pushing/pulling associated with ADLs. Goal Met 3/25/25    Pt to be able to participate in water therapy exercises with min modifications. Not Met 3/25/25    Improve QuickDASH Score to </=  30% impairment, demonstrating improved overall function. Goal Met 2/19/25    Patient will be able to throw a softball. Not Met 3/25/25      [employer]  Access Code: [email or web address]  URL: [email or web address]  Date: 02/19/2025  Prepared by: Jessica Gold    Exercises  - Supine Shoulder Flexion Extension Full Range AROM  - 2 x daily - 2 sets - 10 reps  - Seated Scapular Retraction  - 2 x daily - 2 sets - 10 reps  - Sleeper Stretch  - 2 x daily - 2 sets - 10 reps  - Shoulder External Rotation Reactive Isometrics  - 1 x daily - 2 sets - 10 reps  - Shoulder Internal Rotation Reactive Isometrics  - 1 x daily - 2 sets - 10 reps  - Standing Shoulder Row with Anchored Resistance  - 1 x daily - 2 sets - 10 reps  - Sidelying Shoulder ER with Towel and Dumbbell  - 2 x daily - 2 sets - 10 reps  - [place] [employer]  - 2 x daily - 3 sets - 10 reps  - Doorway Pec Stretch at 90 Degrees Abduction  - 2 x daily - 3 sets - 30 sec hold  - Doorway Pec Stretch at 60 Degrees Abduction with Arm Straight  - 2 x daily - 3 sets - 30 sec  hold

## 2025-03-28 RX ORDER — VONOPRAZAN FUMARATE 26.72 MG/1
1 TABLET ORAL DAILY
COMMUNITY
Start: 2025-02-12

## 2025-03-28 RX ORDER — LEVOTHYROXINE SODIUM 50 UG/1
50 TABLET ORAL DAILY
COMMUNITY
Start: 2025-02-12

## 2025-03-28 NOTE — PROGRESS NOTES
Name: Tyrone Pink  YOB: 1960  Gender: female  MRN: [id number]    CC:   Chief Complaint   Patient presents with    Follow-up     S/p R Shoulder   about 4-5 month out  Right Shoulder Arthroscopy Rotator Cuff Repair And Complex Debridement - Right  11/14/2024    HPI: The patient is doing well status post Right Shoulder Arthroscopy Rotator Cuff Repair And Complex Debridement - Right.   They are still attending physical therapy and are working on a Home Exercise Program.   It is going well and they feel as if they are improving as expected.   Their pain has decreased and they feel as if they have improved from the pre-surgery state.     PmHx: Unchanged since our previous evaluation    ROS: A 12 point review of systems is positive for mild joint pain status post the above mentioned procedure. It is otherwise negative.    Allergies   Allergen Reactions    Morphine Itching     Past Medical History:   Diagnosis Date    Acne rosacea     Anxiety and depression     Aortic insufficiency 6/1/2016    mild to moderate AI     Atopic dermatitis     Atrophic gastritis without hemorrhage     Brain bleed (HCC)     work related injury in 2012    Bronchitis     CAD (coronary artery disease)     Followed by [facility].    Chest pain, mid sternal 5/28/2015    pt denies any recent CP or SOB    Chronic depression 7/11/2016    Chronic intractable pain 7/11/2016    Chronic left hip pain     Claustrophobia     DDD (degenerative disc disease)     Depression headache     Dizziness     Dysuria     Easy bruising     Fatigue     Female stress incontinence 8/28/2015    Fibromyalgia muscle pain     Fibromyalgia syndrome     Headache     Heat stroke and sunstroke     Hormone replacement therapy     Hot flashes     Hypercholesteremia     Hyperlipemia 5/28/2015    Hypersomnia due to medical condition 5/8/2015    Hypertension     Hypothyroidism     Insomnia     Joint inflammation     Joint pain     Keratosis, actinic     Major

## 2025-04-01 ENCOUNTER — TREATMENT (OUTPATIENT)
Age: 65
End: 2025-04-01

## 2025-04-01 DIAGNOSIS — M25.611 STIFFNESS OF RIGHT SHOULDER JOINT: ICD-10-CM

## 2025-04-01 DIAGNOSIS — M25.511 ACUTE PAIN OF RIGHT SHOULDER: Primary | ICD-10-CM

## 2025-04-01 DIAGNOSIS — M62.81 GENERALIZED MUSCLE WEAKNESS: ICD-10-CM

## 2025-04-01 NOTE — PROGRESS NOTES
[facility]  [address] [place] [id number]  Dept: [phone]      Physical Therapy Daily Note     Referring MD: BATOOL Bolden MD  Diagnosis:     ICD-10-CM    1. Acute pain of right shoulder  M25.511       2. Stiffness of right shoulder joint  M25.611       3. Generalized muscle weakness  M62.81         Surgery: Right Shoulder Arthroscopy Rotator Cuff Repair And Complex Debridement - Right  Date: 11/14/2024  Therapy precautions: NWB through R UE, wear sling 4-6 weeks  Co-morbidities affecting plan of care: HTN, high cholesterol    Payor: Payor: [employer] /  /  /  Billing pattern: Government- total time     Total Timed Codes: 45 min, Total Treatment Time: 45 min Modifier needed: No    Episode visit count:  22     PERTINENT MEDICAL HISTORY     Patient  has a past medical history of Acne rosacea, Anxiety and depression, Aortic insufficiency, Atopic dermatitis, Atrophic gastritis without hemorrhage, Brain bleed (HCC), Bronchitis, CAD (coronary artery disease), Chest pain, mid sternal, Chronic depression, Chronic intractable pain, Chronic left hip pain, Claustrophobia, DDD (degenerative disc disease), Depression headache, Dizziness, Dysuria, Easy bruising, Fatigue, Female stress incontinence, Fibromyalgia muscle pain, Fibromyalgia syndrome, Headache, Heat stroke and sunstroke, Hormone replacement therapy, Hot flashes, Hypercholesteremia, Hyperlipemia, Hypersomnia due to medical condition, Hypertension, Hypothyroidism, Insomnia, Joint inflammation, Joint pain, Keratosis, actinic, Major depressive disorder, recurrent episode, moderate with melancholic features (HCC), Menopausal disorder, Migraine, Nocturnal hypoxemia, NSTEMI (non-ST elevated myocardial infarction) ([facility]), Obesity, Obstructive sleep apnea, Osteoarthritis, Palpitations, PLMD (periodic limb movement disorder), Sciatic nerve pain, Sinusitis, Sleep apnea, Tachycardia, and Ulcer of finger ([facility]ly [facility]). . Patient  has a past

## 2025-04-02 ENCOUNTER — OFFICE VISIT (OUTPATIENT)
Dept: ORTHOPEDIC SURGERY | Age: 65
End: 2025-04-02

## 2025-04-02 DIAGNOSIS — Z98.890 S/P RIGHT ROTATOR CUFF REPAIR: ICD-10-CM

## 2025-04-02 DIAGNOSIS — M67.922 TENDINOPATHY OF LEFT BICEPS: ICD-10-CM

## 2025-04-02 DIAGNOSIS — E03.9 HYPOTHYROIDISM, UNSPECIFIED TYPE: ICD-10-CM

## 2025-04-02 DIAGNOSIS — Z09 SURGERY FOLLOW-UP: Primary | ICD-10-CM

## 2025-04-02 RX ORDER — LEVOTHYROXINE SODIUM 75 UG/1
75 TABLET ORAL DAILY
Qty: 90 TABLET | Refills: 1 | OUTPATIENT
Start: 2025-04-02

## 2025-04-05 DIAGNOSIS — E03.9 HYPOTHYROIDISM, UNSPECIFIED TYPE: ICD-10-CM

## 2025-04-07 RX ORDER — LEVOTHYROXINE SODIUM 75 UG/1
75 TABLET ORAL DAILY
Qty: 30 TABLET | Refills: 0 | OUTPATIENT
Start: 2025-04-07

## 2025-04-09 ENCOUNTER — OFFICE VISIT (OUTPATIENT)
Dept: ORTHOPEDIC SURGERY | Age: 65
End: 2025-04-09
Payer: MEDICARE

## 2025-04-09 DIAGNOSIS — F40.240 CLAUSTROPHOBIA: Primary | ICD-10-CM

## 2025-04-09 DIAGNOSIS — M25.561 RIGHT KNEE PAIN, UNSPECIFIED CHRONICITY: Primary | ICD-10-CM

## 2025-04-09 PROCEDURE — G8427 DOCREV CUR MEDS BY ELIG CLIN: HCPCS | Performed by: PHYSICIAN ASSISTANT

## 2025-04-09 PROCEDURE — 3017F COLORECTAL CA SCREEN DOC REV: CPT | Performed by: PHYSICIAN ASSISTANT

## 2025-04-09 PROCEDURE — G8417 CALC BMI ABV UP PARAM F/U: HCPCS | Performed by: PHYSICIAN ASSISTANT

## 2025-04-09 PROCEDURE — 1036F TOBACCO NON-USER: CPT | Performed by: PHYSICIAN ASSISTANT

## 2025-04-09 PROCEDURE — 99214 OFFICE O/P EST MOD 30 MIN: CPT | Performed by: PHYSICIAN ASSISTANT

## 2025-04-09 RX ORDER — ALPRAZOLAM 0.5 MG
0.5 TABLET ORAL ONCE AS NEEDED
Qty: 2 TABLET | Refills: 0 | Status: SHIPPED | OUTPATIENT
Start: 2025-04-09 | End: 2025-04-10

## 2025-04-10 NOTE — PROGRESS NOTES
Non-orthopaedic concerns were referred back to the primary care physician.      has a past medical history of Acne rosacea, Anxiety and depression, Aortic insufficiency (6/1/2016), Atopic dermatitis, Atrophic gastritis without hemorrhage, Brain bleed ([facility]), Bronchitis, CAD (coronary artery disease), Chest pain, mid sternal (5/28/2015), Chronic depression (7/11/2016), Chronic intractable pain (7/11/2016), Chronic left hip pain, Claustrophobia, DDD (degenerative disc disease), Depression headache, Dizziness, Dysuria, Easy bruising, Fatigue, Female stress incontinence (8/28/2015), Fibromyalgia muscle pain, Fibromyalgia syndrome, Headache, Heat stroke and sunstroke, Hormone replacement therapy, Hot flashes, Hypercholesteremia, Hyperlipemia (5/28/2015), Hypersomnia due to medical condition (5/8/2015), Hypertension, Hypothyroidism, Insomnia, Joint inflammation, Joint pain, Keratosis, actinic, Major depressive disorder, recurrent episode, moderate with melancholic features ([facility]) (7/11/2016), Menopausal disorder, Migraine, Nocturnal hypoxemia (5/8/2015), NSTEMI (non-ST elevated myocardial infarction) ([facility]) (5/28/2015), Obesity (7/11/2016), Obstructive sleep apnea (5/8/2015), Osteoarthritis (7/11/2016), Palpitations, PLMD (periodic limb movement disorder) (5/8/2015), Sciatic nerve pain (7/11/2016), Sinusitis, Sleep apnea, Tachycardia (6/1/2016), and Ulcer of finger ([facility]).    PE:  General appearance is that of a healthy patient, alert and oriented, in no distress.  Neck shows no significant abnormalities.   Heart:   Regular rhythm, regular pulses.    Lungs:  Are clear, non-labored respirations.   Back and bilateral hips show no significant abnormalities with good ROM and no referral to LE with movement. No tenderness to bilateral hips.   R and L upper extremities show no significant abnormalities.  Both calves are soft.  Dorsalis pedis pulses are 2+ and symmetrical.    Motor and sensory exam is intact and equal in both feet.

## 2025-05-19 NOTE — PROGRESS NOTES
Minimal surface  irregularity of the articular cartilage of the medial and lateral patellar  facets (axial series 5 images 7-11). Normal medial and lateral patellar  retinaculum.    Normal quadriceps tendon.  Normal patellar tendon.  Normal Hoffa's  fat pad. Small joint effusion with small septated dissecting popliteal cyst  (axial series 5 images 6-13).  Prepatellar subcutaneous soft tissue edema (sagittal series 2 image 17). Other  osseous structures are normal.    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  Mild arthrosis of the lateral femorotibial compartment.  Minimal surface irregularity of the articular cartilage of the medial and  lateral patellar facets.  Prepatellar subcutaneous soft tissue edema.  Small joint effusion with small septated popliteal cyst.    Independently Reviewed today: Minimal arthritic change.  No meniscal tearing.  Some small cysts as noted near the popliteal fossa.  They look benign.    Tobacco:  reports that she has never smoked. She has never used smokeless tobacco.  Lab Results   Component Value Date    LABA1C 5.7 (H) 01/14/2025     Lab Results   Component Value Date    CREATININE 0.91 01/14/2025       Assessment:      ICD-10-CM    1. Right knee pain, unspecified chronicity  M25.561         Assessment & Plan  1. Right knee pain:        Advised to take anti-inflammatory medication, specifically Advil Dual Action, at a dosage of 800 mg three times daily for a period of 5 consecutive days. If the pain persists after this course of treatment, further evaluation will be necessary. No indication for an injection at this time.     Follow-up:  Appointment scheduled in a couple of weeks at [facility].    The patient (or guardian, if applicable) and other individuals in attendance with the patient were advised that Artificial Intelligence will be utilized during this visit to record, process the conversation to generate a clinical note, and support improvement of the

## 2025-05-21 ENCOUNTER — OFFICE VISIT (OUTPATIENT)
Dept: ORTHOPEDIC SURGERY | Age: 65
End: 2025-05-21
Payer: MEDICARE

## 2025-05-21 DIAGNOSIS — M17.11 ARTHRITIS OF RIGHT KNEE: ICD-10-CM

## 2025-05-21 DIAGNOSIS — M25.561 RIGHT KNEE PAIN, UNSPECIFIED CHRONICITY: Primary | ICD-10-CM

## 2025-05-21 PROCEDURE — G8417 CALC BMI ABV UP PARAM F/U: HCPCS | Performed by: ORTHOPAEDIC SURGERY

## 2025-05-21 PROCEDURE — G8428 CUR MEDS NOT DOCUMENT: HCPCS | Performed by: ORTHOPAEDIC SURGERY

## 2025-05-21 PROCEDURE — 3017F COLORECTAL CA SCREEN DOC REV: CPT | Performed by: ORTHOPAEDIC SURGERY

## 2025-05-21 PROCEDURE — 99213 OFFICE O/P EST LOW 20 MIN: CPT | Performed by: ORTHOPAEDIC SURGERY

## 2025-05-21 PROCEDURE — 1036F TOBACCO NON-USER: CPT | Performed by: ORTHOPAEDIC SURGERY

## 2025-07-20 ENCOUNTER — HOSPITAL ENCOUNTER (EMERGENCY)
Age: 65
Discharge: HOME | End: 2025-07-20
Attending: EMERGENCY MEDICINE
Payer: MEDICARE

## 2025-07-20 VITALS
SYSTOLIC BLOOD PRESSURE: 112 MMHG | TEMPERATURE: 98.1 F | BODY MASS INDEX: 34.96 KG/M2 | HEIGHT: 62 IN | RESPIRATION RATE: 24 BRPM | OXYGEN SATURATION: 96 % | WEIGHT: 190 LBS | DIASTOLIC BLOOD PRESSURE: 75 MMHG | HEART RATE: 84 BPM

## 2025-07-20 DIAGNOSIS — R10.84 GENERALIZED ABDOMINAL PAIN: ICD-10-CM

## 2025-07-20 DIAGNOSIS — R11.2 NAUSEA VOMITING AND DIARRHEA: Primary | ICD-10-CM

## 2025-07-20 DIAGNOSIS — R19.7 NAUSEA VOMITING AND DIARRHEA: Primary | ICD-10-CM

## 2025-07-20 LAB
ALBUMIN SERPL-MCNC: 4.4 G/DL (ref 3.2–4.6)
ALBUMIN/GLOB SERPL: 1.4 (ref 1–1.9)
ALP SERPL-CCNC: 83 U/L (ref 35–104)
ALT SERPL-CCNC: 9 U/L (ref 12–65)
ANION GAP SERPL CALC-SCNC: 17 MMOL/L (ref 7–16)
AST SERPL-CCNC: 29 U/L (ref 15–37)
BASOPHILS # BLD: 0.01 K/UL (ref 0–0.2)
BASOPHILS NFR BLD: 0.1 % (ref 0–2)
BILIRUB SERPL-MCNC: 0.5 MG/DL (ref 0–1.2)
BUN SERPL-MCNC: 12 MG/DL (ref 8–23)
CALCIUM SERPL-MCNC: 9.5 MG/DL (ref 8.8–10.2)
CHLORIDE SERPL-SCNC: 100 MMOL/L (ref 98–107)
CO2 SERPL-SCNC: 21 MMOL/L (ref 20–29)
CREAT SERPL-MCNC: 0.88 MG/DL (ref 0.8–1.3)
DIFFERENTIAL METHOD BLD: ABNORMAL
EOSINOPHIL # BLD: 0.27 K/UL (ref 0–0.8)
EOSINOPHIL NFR BLD: 2.1 % (ref 0.5–7.8)
ERYTHROCYTE [DISTWIDTH] IN BLOOD BY AUTOMATED COUNT: 15.4 % (ref 11.9–14.6)
GLOBULIN SER CALC-MCNC: 3.2 G/DL (ref 2.3–3.5)
GLUCOSE SERPL-MCNC: 137 MG/DL (ref 65–100)
HCT VFR BLD AUTO: 41.1 % (ref 35.8–46.3)
HGB BLD-MCNC: 13.2 G/DL (ref 11.7–15.4)
IMM GRANULOCYTES # BLD AUTO: 0.05 K/UL (ref 0–0.5)
IMM GRANULOCYTES NFR BLD AUTO: 0.4 % (ref 0–5)
LIPASE SERPL-CCNC: 22 U/L (ref 13–60)
LYMPHOCYTES # BLD: 1.43 K/UL (ref 0.5–4.6)
LYMPHOCYTES NFR BLD: 10.9 % (ref 13–44)
MCH RBC QN AUTO: 24.4 PG (ref 26.1–32.9)
MCHC RBC AUTO-ENTMCNC: 32.1 G/DL (ref 31.4–35)
MCV RBC AUTO: 76 FL (ref 82–102)
MONOCYTES # BLD: 0.67 K/UL (ref 0.1–1.3)
MONOCYTES NFR BLD: 5.1 % (ref 4–12)
NEUTS SEG # BLD: 10.72 K/UL (ref 1.7–8.2)
NEUTS SEG NFR BLD: 81.4 % (ref 43–78)
NRBC # BLD: 0 K/UL (ref 0–0.2)
PLATELET # BLD AUTO: 301 K/UL (ref 150–450)
PMV BLD AUTO: 10.9 FL (ref 9.4–12.3)
POTASSIUM SERPL-SCNC: 4.2 MMOL/L (ref 3.5–5.1)
PROT SERPL-MCNC: 7.6 G/DL (ref 6.3–8.2)
RBC # BLD AUTO: 5.41 M/UL (ref 4.05–5.2)
SODIUM SERPL-SCNC: 138 MMOL/L (ref 133–143)
WBC # BLD AUTO: 13.2 K/UL (ref 4.3–11.1)

## 2025-07-20 PROCEDURE — 83690 ASSAY OF LIPASE: CPT

## 2025-07-20 PROCEDURE — 96375 TX/PRO/DX INJ NEW DRUG ADDON: CPT

## 2025-07-20 PROCEDURE — 80053 COMPREHEN METABOLIC PANEL: CPT

## 2025-07-20 PROCEDURE — 96374 THER/PROPH/DIAG INJ IV PUSH: CPT

## 2025-07-20 PROCEDURE — 2580000003 HC RX 258: Performed by: EMERGENCY MEDICINE

## 2025-07-20 PROCEDURE — 99284 EMERGENCY DEPT VISIT MOD MDM: CPT

## 2025-07-20 PROCEDURE — 6370000000 HC RX 637 (ALT 250 FOR IP): Performed by: EMERGENCY MEDICINE

## 2025-07-20 PROCEDURE — 85025 COMPLETE CBC W/AUTO DIFF WBC: CPT

## 2025-07-20 PROCEDURE — 6360000002 HC RX W HCPCS: Performed by: EMERGENCY MEDICINE

## 2025-07-20 RX ORDER — HYOSCYAMINE SULFATE 0.12 MG/1
0.25 TABLET SUBLINGUAL
Status: COMPLETED | OUTPATIENT
Start: 2025-07-20 | End: 2025-07-20

## 2025-07-20 RX ORDER — PROMETHAZINE HYDROCHLORIDE 25 MG/1
25 TABLET ORAL 4 TIMES DAILY PRN
Qty: 12 TABLET | Refills: 0 | Status: SHIPPED | OUTPATIENT
Start: 2025-07-20 | End: 2025-07-24

## 2025-07-20 RX ORDER — ONDANSETRON 8 MG/1
8 TABLET, ORALLY DISINTEGRATING ORAL EVERY 8 HOURS PRN
Qty: 12 TABLET | Refills: 2 | Status: SHIPPED | OUTPATIENT
Start: 2025-07-20

## 2025-07-20 RX ORDER — DIPHENOXYLATE HYDROCHLORIDE AND ATROPINE SULFATE 2.5; .025 MG/1; MG/1
2 TABLET ORAL 4 TIMES DAILY PRN
Qty: 16 TABLET | Refills: 0 | Status: SHIPPED | OUTPATIENT
Start: 2025-07-20 | End: 2025-07-24

## 2025-07-20 RX ORDER — DIPHENHYDRAMINE HYDROCHLORIDE 50 MG/ML
12.5 INJECTION, SOLUTION INTRAMUSCULAR; INTRAVENOUS
Status: COMPLETED | OUTPATIENT
Start: 2025-07-20 | End: 2025-07-20

## 2025-07-20 RX ORDER — 0.9 % SODIUM CHLORIDE 0.9 %
1000 INTRAVENOUS SOLUTION INTRAVENOUS ONCE
Status: COMPLETED | OUTPATIENT
Start: 2025-07-20 | End: 2025-07-20

## 2025-07-20 RX ORDER — PROCHLORPERAZINE EDISYLATE 5 MG/ML
10 INJECTION INTRAMUSCULAR; INTRAVENOUS
Status: COMPLETED | OUTPATIENT
Start: 2025-07-20 | End: 2025-07-20

## 2025-07-20 RX ORDER — HYOSCYAMINE SULFATE 0.12 MG/1
0.25 TABLET ORAL EVERY 6 HOURS PRN
Qty: 20 TABLET | Refills: 1 | Status: SHIPPED | OUTPATIENT
Start: 2025-07-20

## 2025-07-20 RX ORDER — DIPHENOXYLATE HYDROCHLORIDE AND ATROPINE SULFATE 2.5; .025 MG/1; MG/1
2 TABLET ORAL
Status: COMPLETED | OUTPATIENT
Start: 2025-07-20 | End: 2025-07-20

## 2025-07-20 RX ORDER — KETOROLAC TROMETHAMINE 30 MG/ML
30 INJECTION, SOLUTION INTRAMUSCULAR; INTRAVENOUS
Status: COMPLETED | OUTPATIENT
Start: 2025-07-20 | End: 2025-07-20

## 2025-07-20 RX ORDER — PROMETHAZINE HYDROCHLORIDE 25 MG/1
25 SUPPOSITORY RECTAL EVERY 6 HOURS PRN
Qty: 8 SUPPOSITORY | Refills: 0 | Status: SHIPPED | OUTPATIENT
Start: 2025-07-20

## 2025-07-20 RX ADMIN — HYOSCYAMINE SULFATE 0.25 MG: 0.12 TABLET ORAL; SUBLINGUAL at 19:11

## 2025-07-20 RX ADMIN — KETOROLAC TROMETHAMINE 30 MG: 30 INJECTION, SOLUTION INTRAMUSCULAR at 19:17

## 2025-07-20 RX ADMIN — DIPHENHYDRAMINE HYDROCHLORIDE 12.5 MG: 50 INJECTION INTRAMUSCULAR; INTRAVENOUS at 19:11

## 2025-07-20 RX ADMIN — DIPHENOXYLATE HYDROCHLORIDE AND ATROPINE SULFATE 2 TABLET: 2.5; .025 TABLET ORAL at 19:18

## 2025-07-20 RX ADMIN — SODIUM CHLORIDE 1000 ML: 0.9 INJECTION, SOLUTION INTRAVENOUS at 19:11

## 2025-07-20 RX ADMIN — PROCHLORPERAZINE EDISYLATE 10 MG: 5 INJECTION INTRAMUSCULAR; INTRAVENOUS at 19:11

## 2025-07-20 ASSESSMENT — PAIN SCALES - GENERAL
PAINLEVEL_OUTOF10: 8
PAINLEVEL_OUTOF10: 0
PAINLEVEL_OUTOF10: 3
PAINLEVEL_OUTOF10: 8

## 2025-07-20 ASSESSMENT — PAIN DESCRIPTION - LOCATION
LOCATION: ABDOMEN

## 2025-07-20 ASSESSMENT — PAIN - FUNCTIONAL ASSESSMENT
PAIN_FUNCTIONAL_ASSESSMENT: 0-10
PAIN_FUNCTIONAL_ASSESSMENT: NONE - DENIES PAIN

## 2025-07-20 ASSESSMENT — PAIN DESCRIPTION - DESCRIPTORS
DESCRIPTORS: CRAMPING
DESCRIPTORS: CRAMPING

## 2025-07-20 ASSESSMENT — PAIN DESCRIPTION - ORIENTATION
ORIENTATION: LEFT;RIGHT;UPPER;LOWER
ORIENTATION: RIGHT;LEFT;UPPER;LOWER

## 2025-07-20 NOTE — ED TRIAGE NOTES
Pt via wheelchair to triage for reports of n/v/d & fatigue. Pt states she had first dose of trulicity on Friday & states she had to go to [place] due to same symptoms. Pt states she was given medications & IV fluids. Pt reports continuous vomiting & inability to tolerate PO. Pt denies any abdominal pain.

## 2025-07-20 NOTE — ED PROVIDER NOTES
Emergency Department Provider Note       PCP: Arnoldo Hi MD   Age: 65 y.o.   Sex: female     DISPOSITION                No diagnosis found.    Medical Decision Making     65-year-old lady with abdominal pain, nausea, vomiting, diarrhea, suspect viral syndrome, much better after conservative medicines, labs look good.  Tolerating fluids, home with similar prescriptions.     1 acute, uncomplicated illness or injury.  Over the counter drug management performed.  Prescription drug management performed.  Patient was discharged risks and benefits of hospitalization were considered.  Shared medical decision making was utilized in creating the patients health plan today.    I independently ordered and reviewed each unique test.  I reviewed external records: ED visit note from a different ED.   I reviewed external records: previous lab results from outside ED.   The patients assessment required an independent historian: Family at bedside.  The reason they were needed is important historical information not provided by the patient.                History     HISTORY OF PRESENT ILLNESS  65-year-old Female with history of myocardial infarction at age 57, presents with persistent nausea and vomiting that began Monday a week ago after receiving her first trulucity injection and then working outside in the heat all day. Vomiting occurred 10-12 times daily from Tuesday through Thursday, with one episode today; diarrhea started Friday and has increased to continuous episodes today. She reports inability to tolerate oral intake and recently developed heavy central chest pain since arrival to the ER. She was treated at [facility] on Friday and discharged with antiemetic medication. She denies urinary symptoms such as dysuria or pain but was told she may have a UTI but they were not going to treat it yet, presumably watch urine culture.  No medication allergies are mentioned.          ROS     Review of Systems

## 2025-07-21 ASSESSMENT — ENCOUNTER SYMPTOMS
DIARRHEA: 1
SHORTNESS OF BREATH: 0
RHINORRHEA: 0
COLOR CHANGE: 0
BACK PAIN: 0
EYE DISCHARGE: 0
COUGH: 0
NAUSEA: 1
EYE REDNESS: 0
VOMITING: 1
FACIAL SWELLING: 0
ABDOMINAL PAIN: 1

## 2025-07-21 NOTE — ED NOTES
I have reviewed discharge instructions with the patient.  The patient verbalized understanding.    Patient left ED via Discharge Method: ambulatory to Home with family.    Opportunity for questions and clarification provided.       Patient given 5 scripts.         To continue your aftercare when you leave the hospital, you may receive an automated call from our care team to check in on how you are doing.  This is a free service and part of our promise to provide the best care and service to meet your aftercare needs.” If you have questions, or wish to unsubscribe from this service please call [phone].  Thank you for Choosing our [facility] Emergency Department.

## 2025-08-15 ENCOUNTER — OFFICE VISIT (OUTPATIENT)
Age: 65
End: 2025-08-15
Payer: MEDICARE

## 2025-08-15 VITALS
WEIGHT: 184 LBS | DIASTOLIC BLOOD PRESSURE: 70 MMHG | HEIGHT: 63 IN | BODY MASS INDEX: 32.6 KG/M2 | SYSTOLIC BLOOD PRESSURE: 102 MMHG | HEART RATE: 68 BPM

## 2025-08-15 DIAGNOSIS — I25.10 CORONARY ARTERY DISEASE INVOLVING NATIVE CORONARY ARTERY OF NATIVE HEART WITHOUT ANGINA PECTORIS: ICD-10-CM

## 2025-08-15 DIAGNOSIS — I10 ESSENTIAL HYPERTENSION: ICD-10-CM

## 2025-08-15 DIAGNOSIS — I35.1 NONRHEUMATIC AORTIC VALVE INSUFFICIENCY: Primary | ICD-10-CM

## 2025-08-15 DIAGNOSIS — G47.33 OBSTRUCTIVE SLEEP APNEA: ICD-10-CM

## 2025-08-15 DIAGNOSIS — E78.5 DYSLIPIDEMIA: ICD-10-CM

## 2025-08-15 LAB
CHOLEST SERPL-MCNC: 164 MG/DL (ref 0–200)
HDLC SERPL-MCNC: 37 MG/DL (ref 40–60)
HDLC SERPL: 4.4 (ref 0–5)
LDLC SERPL CALC-MCNC: 103 MG/DL (ref 0–100)
LDLC SERPL DIRECT ASSAY-MCNC: 107 MG/DL (ref 0–100)
TRIGL SERPL-MCNC: 120 MG/DL (ref 0–150)
VLDLC SERPL CALC-MCNC: 24 MG/DL (ref 6–23)

## 2025-08-15 PROCEDURE — 3017F COLORECTAL CA SCREEN DOC REV: CPT | Performed by: INTERNAL MEDICINE

## 2025-08-15 PROCEDURE — G8417 CALC BMI ABV UP PARAM F/U: HCPCS | Performed by: INTERNAL MEDICINE

## 2025-08-15 PROCEDURE — 3078F DIAST BP <80 MM HG: CPT | Performed by: INTERNAL MEDICINE

## 2025-08-15 PROCEDURE — 1036F TOBACCO NON-USER: CPT | Performed by: INTERNAL MEDICINE

## 2025-08-15 PROCEDURE — 3074F SYST BP LT 130 MM HG: CPT | Performed by: INTERNAL MEDICINE

## 2025-08-15 PROCEDURE — 99214 OFFICE O/P EST MOD 30 MIN: CPT | Performed by: INTERNAL MEDICINE

## 2025-08-15 PROCEDURE — G8427 DOCREV CUR MEDS BY ELIG CLIN: HCPCS | Performed by: INTERNAL MEDICINE

## 2025-08-17 LAB — APO B SERPL-MCNC: 93 MG/DL

## 2025-08-19 LAB — LPA SERPL-SCNC: 345 NMOL/L

## (undated) DEVICE — SHOULDER STABILIZATION KIT,                                    DISPOSABLE 12 PER BOX

## (undated) DEVICE — Device

## (undated) DEVICE — GLOVE SURG SZ 7 L12IN FNGR THK79MIL GRN LTX FREE

## (undated) DEVICE — PAD FLR CLN ABSORBENT 46X40 IN IMPERV BLU QUIK SUITE LTX

## (undated) DEVICE — [THREADED CANNULA.  DO NOT RESTERILIZE,  DO NOT USE IF PACKAGE IS DAMAGED]: Brand: DRI-LOK

## (undated) DEVICE — 3M™ STERI-DRAPE™ U-DRAPE 1015: Brand: STERI-DRAPE™

## (undated) DEVICE — 4-PORT MANIFOLD: Brand: NEPTUNE 2

## (undated) DEVICE — SOFT SILICONE HYDROCELLULAR FOAM DRESSING WITH LOCK AWAY LAYER: Brand: ALLEVYN LIFE XL 21X21 CTN10

## (undated) DEVICE — SUTURE MONOCRYL SZ 2-0 L27IN ABSRB UD CP-1 1 L36MM 1/2 CIR REV Y266H

## (undated) DEVICE — SHOULDER ARTHRO DR KOCH: Brand: MEDLINE INDUSTRIES, INC.

## (undated) DEVICE — SOLUTION IRRIG 3000ML 0.9% SOD CHL USP UROMATIC PLAS CONT

## (undated) DEVICE — SWITCH DRAPE TENET 7633

## (undated) DEVICE — GARMENT,MEDLINE,DVT,INT,CALF,LG, GEN2: Brand: MEDLINE

## (undated) DEVICE — ABLATOR ENDOSCOPIC ELECTROCAUTERY 90 DEG RF ASPIRATING STERILE DISPOSABLE APOLLORF I90